# Patient Record
Sex: FEMALE | Race: ASIAN | NOT HISPANIC OR LATINO | URBAN - METROPOLITAN AREA
[De-identification: names, ages, dates, MRNs, and addresses within clinical notes are randomized per-mention and may not be internally consistent; named-entity substitution may affect disease eponyms.]

---

## 2019-11-22 ENCOUNTER — EMERGENCY (EMERGENCY)
Facility: HOSPITAL | Age: 39
LOS: 0 days | Discharge: HOME | End: 2019-11-23
Attending: EMERGENCY MEDICINE
Payer: MEDICAID

## 2019-11-22 VITALS
TEMPERATURE: 98 F | DIASTOLIC BLOOD PRESSURE: 96 MMHG | OXYGEN SATURATION: 98 % | HEART RATE: 76 BPM | WEIGHT: 282.19 LBS | RESPIRATION RATE: 16 BRPM | SYSTOLIC BLOOD PRESSURE: 158 MMHG

## 2019-11-22 DIAGNOSIS — R10.13 EPIGASTRIC PAIN: ICD-10-CM

## 2019-11-22 DIAGNOSIS — R10.9 UNSPECIFIED ABDOMINAL PAIN: ICD-10-CM

## 2019-11-22 DIAGNOSIS — E03.9 HYPOTHYROIDISM, UNSPECIFIED: ICD-10-CM

## 2019-11-22 PROCEDURE — 99284 EMERGENCY DEPT VISIT MOD MDM: CPT

## 2019-11-23 VITALS
SYSTOLIC BLOOD PRESSURE: 142 MMHG | HEART RATE: 64 BPM | DIASTOLIC BLOOD PRESSURE: 89 MMHG | RESPIRATION RATE: 18 BRPM | OXYGEN SATURATION: 100 %

## 2019-11-23 LAB
ALBUMIN SERPL ELPH-MCNC: 3.3 G/DL — SIGNIFICANT CHANGE UP (ref 3.3–5)
ALP SERPL-CCNC: 89 U/L — SIGNIFICANT CHANGE UP (ref 40–120)
ALT FLD-CCNC: 26 U/L — SIGNIFICANT CHANGE UP (ref 12–78)
ANION GAP SERPL CALC-SCNC: 6 MMOL/L — SIGNIFICANT CHANGE UP (ref 5–17)
APPEARANCE UR: CLEAR — SIGNIFICANT CHANGE UP
APTT BLD: 32.8 SEC — SIGNIFICANT CHANGE UP (ref 28.5–37)
AST SERPL-CCNC: 16 U/L — SIGNIFICANT CHANGE UP (ref 15–37)
BASOPHILS # BLD AUTO: 0.06 K/UL — SIGNIFICANT CHANGE UP (ref 0–0.2)
BASOPHILS NFR BLD AUTO: 0.6 % — SIGNIFICANT CHANGE UP (ref 0–2)
BILIRUB SERPL-MCNC: 0.2 MG/DL — SIGNIFICANT CHANGE UP (ref 0.2–1.2)
BILIRUB UR-MCNC: NEGATIVE — SIGNIFICANT CHANGE UP
BUN SERPL-MCNC: 16 MG/DL — SIGNIFICANT CHANGE UP (ref 7–23)
CALCIUM SERPL-MCNC: 8.4 MG/DL — LOW (ref 8.5–10.1)
CHLORIDE SERPL-SCNC: 105 MMOL/L — SIGNIFICANT CHANGE UP (ref 96–108)
CK MB CFR SERPL CALC: 1.1 NG/ML — SIGNIFICANT CHANGE UP (ref 0.5–3.6)
CO2 SERPL-SCNC: 27 MMOL/L — SIGNIFICANT CHANGE UP (ref 22–31)
COLOR SPEC: YELLOW — SIGNIFICANT CHANGE UP
CREAT SERPL-MCNC: 0.78 MG/DL — SIGNIFICANT CHANGE UP (ref 0.5–1.3)
DIFF PNL FLD: NEGATIVE — SIGNIFICANT CHANGE UP
EOSINOPHIL # BLD AUTO: 0.17 K/UL — SIGNIFICANT CHANGE UP (ref 0–0.5)
EOSINOPHIL NFR BLD AUTO: 1.7 % — SIGNIFICANT CHANGE UP (ref 0–6)
EPI CELLS # UR: SIGNIFICANT CHANGE UP
GLUCOSE SERPL-MCNC: 118 MG/DL — HIGH (ref 70–99)
GLUCOSE UR QL: NEGATIVE MG/DL — SIGNIFICANT CHANGE UP
HCG SERPL-ACNC: <1 MIU/ML — SIGNIFICANT CHANGE UP
HCT VFR BLD CALC: 37.6 % — SIGNIFICANT CHANGE UP (ref 34.5–45)
HGB BLD-MCNC: 12.1 G/DL — SIGNIFICANT CHANGE UP (ref 11.5–15.5)
IMM GRANULOCYTES NFR BLD AUTO: 0.3 % — SIGNIFICANT CHANGE UP (ref 0–1.5)
INR BLD: 1.1 RATIO — SIGNIFICANT CHANGE UP (ref 0.88–1.16)
KETONES UR-MCNC: NEGATIVE — SIGNIFICANT CHANGE UP
LACTATE SERPL-SCNC: 1 MMOL/L — SIGNIFICANT CHANGE UP (ref 0.7–2)
LEUKOCYTE ESTERASE UR-ACNC: ABNORMAL
LIDOCAIN IGE QN: 144 U/L — SIGNIFICANT CHANGE UP (ref 73–393)
LYMPHOCYTES # BLD AUTO: 3.67 K/UL — HIGH (ref 1–3.3)
LYMPHOCYTES # BLD AUTO: 36.5 % — SIGNIFICANT CHANGE UP (ref 13–44)
MCHC RBC-ENTMCNC: 25.3 PG — LOW (ref 27–34)
MCHC RBC-ENTMCNC: 32.2 GM/DL — SIGNIFICANT CHANGE UP (ref 32–36)
MCV RBC AUTO: 78.7 FL — LOW (ref 80–100)
MONOCYTES # BLD AUTO: 0.73 K/UL — SIGNIFICANT CHANGE UP (ref 0–0.9)
MONOCYTES NFR BLD AUTO: 7.3 % — SIGNIFICANT CHANGE UP (ref 2–14)
NEUTROPHILS # BLD AUTO: 5.39 K/UL — SIGNIFICANT CHANGE UP (ref 1.8–7.4)
NEUTROPHILS NFR BLD AUTO: 53.6 % — SIGNIFICANT CHANGE UP (ref 43–77)
NITRITE UR-MCNC: NEGATIVE — SIGNIFICANT CHANGE UP
NRBC # BLD: 0 /100 WBCS — SIGNIFICANT CHANGE UP (ref 0–0)
PH UR: 5 — SIGNIFICANT CHANGE UP (ref 5–8)
PLATELET # BLD AUTO: 334 K/UL — SIGNIFICANT CHANGE UP (ref 150–400)
POTASSIUM SERPL-MCNC: 3.6 MMOL/L — SIGNIFICANT CHANGE UP (ref 3.5–5.3)
POTASSIUM SERPL-SCNC: 3.6 MMOL/L — SIGNIFICANT CHANGE UP (ref 3.5–5.3)
PROT SERPL-MCNC: 7.7 GM/DL — SIGNIFICANT CHANGE UP (ref 6–8.3)
PROT UR-MCNC: 30 MG/DL
PROTHROM AB SERPL-ACNC: 12.3 SEC — SIGNIFICANT CHANGE UP (ref 10–12.9)
RBC # BLD: 4.78 M/UL — SIGNIFICANT CHANGE UP (ref 3.8–5.2)
RBC # FLD: 14.2 % — SIGNIFICANT CHANGE UP (ref 10.3–14.5)
RBC CASTS # UR COMP ASSIST: NEGATIVE /HPF — SIGNIFICANT CHANGE UP (ref 0–4)
SODIUM SERPL-SCNC: 138 MMOL/L — SIGNIFICANT CHANGE UP (ref 135–145)
SP GR SPEC: 1.02 — SIGNIFICANT CHANGE UP (ref 1.01–1.02)
TROPONIN I SERPL-MCNC: <.015 NG/ML — SIGNIFICANT CHANGE UP (ref 0.01–0.04)
UROBILINOGEN FLD QL: NEGATIVE MG/DL — SIGNIFICANT CHANGE UP
WBC # BLD: 10.1 K/UL — SIGNIFICANT CHANGE UP (ref 3.8–10.5)
WBC # FLD AUTO: 10.1 K/UL — SIGNIFICANT CHANGE UP (ref 3.8–10.5)
WBC UR QL: SIGNIFICANT CHANGE UP

## 2019-11-23 PROCEDURE — 74177 CT ABD & PELVIS W/CONTRAST: CPT | Mod: 26

## 2019-11-23 RX ORDER — LIDOCAINE 4 G/100G
10 CREAM TOPICAL ONCE
Refills: 0 | Status: COMPLETED | OUTPATIENT
Start: 2019-11-23 | End: 2019-11-23

## 2019-11-23 RX ORDER — METOCLOPRAMIDE HCL 10 MG
10 TABLET ORAL ONCE
Refills: 0 | Status: COMPLETED | OUTPATIENT
Start: 2019-11-23 | End: 2019-11-23

## 2019-11-23 RX ORDER — FAMOTIDINE 10 MG/ML
20 INJECTION INTRAVENOUS ONCE
Refills: 0 | Status: COMPLETED | OUTPATIENT
Start: 2019-11-23 | End: 2019-11-23

## 2019-11-23 RX ORDER — ACETAMINOPHEN 500 MG
975 TABLET ORAL ONCE
Refills: 0 | Status: COMPLETED | OUTPATIENT
Start: 2019-11-23 | End: 2019-11-23

## 2019-11-23 RX ORDER — IOHEXOL 300 MG/ML
30 INJECTION, SOLUTION INTRAVENOUS ONCE
Refills: 0 | Status: COMPLETED | OUTPATIENT
Start: 2019-11-23 | End: 2019-11-23

## 2019-11-23 RX ADMIN — IOHEXOL 30 MILLILITER(S): 300 INJECTION, SOLUTION INTRAVENOUS at 01:59

## 2019-11-23 RX ADMIN — Medication 30 MILLILITER(S): at 02:31

## 2019-11-23 RX ADMIN — LIDOCAINE 10 MILLILITER(S): 4 CREAM TOPICAL at 02:31

## 2019-11-23 RX ADMIN — Medication 10 MILLIGRAM(S): at 00:53

## 2019-11-23 RX ADMIN — Medication 975 MILLIGRAM(S): at 00:54

## 2019-11-23 RX ADMIN — FAMOTIDINE 20 MILLIGRAM(S): 10 INJECTION INTRAVENOUS at 00:54

## 2019-11-23 NOTE — ED PROVIDER NOTE - CLINICAL SUMMARY MEDICAL DECISION MAKING FREE TEXT BOX
40 yo F with epigstric pain, tender, r/o pancreatitis, doubt ischemia, obstruction, pregnancy, ACS, UTI/renal stone   -labs, CT, pepcid/reglan/maalox/tylenol for pain, monitor   -f/u results, reeval

## 2019-11-23 NOTE — ED PROVIDER NOTE - PROGRESS NOTE DETAILS
Results reported to patient--grossly benign, CT shows umbilical hernia (non-obstructed), labs wnl, likely GERD as cause of symptoms   Pt. reports feeling better after meds  pt. agrees to f/u with primary care outpt., referred to GI and Gen Sx for f/u   pt. understands to return to ED if symptoms worsen; will d/c

## 2019-11-23 NOTE — ED PROVIDER NOTE - CARE PROVIDERS DIRECT ADDRESSES
[email or web address]

## 2019-11-23 NOTE — ED PROVIDER NOTE - PROVIDER TOKENS
PROVIDER:[TOKEN:[[id number]],FOLLOWUP:[4-6 Days]],PROVIDER:[TOKEN:[[id number]],FOLLOWUP:[4-6 Days]]

## 2019-11-23 NOTE — ED PROVIDER NOTE - CARE PROVIDER_API CALL
Tian Cortes (MD)  Medicine  [address], [address]  [place] [address]  Phone: [phone]  Fax: [phone]  Follow Up Time: 4-6 Days    Dieudonne Douglass (DO)  Surgery  [address] [place] [address]  Phone: [phone]  Fax: [phone]  Follow Up Time: 4-6 Days

## 2019-11-23 NOTE — ED PROVIDER NOTE - PHYSICAL EXAMINATION
Vitals: HTN at 158/96, otherwise WNL  Gen: AAOx3, NAD, sitting comfortably in stretcher  Head: ncat, perrla, eomi b/l  Neck: supple, no lymphadenopathy, no midline deviation  Heart: rrr, no m/r/g  Lungs: CTA b/l, no rales/ronchi/wheezes  Abd: soft, +tender in epigastrium, obese but non-distended, no rebound or guarding  Ext: no clubbing/cyanosis/edema  Neuro: sensation and muscle strength intact b/l, steady gait

## 2019-11-23 NOTE — ED ADULT NURSE NOTE - OBJECTIVE STATEMENT
# [id number], mid abdominal pain, onset yesterday. Denies any nausea, vomiting, fever, chills, dizziness

## 2019-11-23 NOTE — ED PROVIDER NOTE - PATIENT PORTAL LINK FT
You can access the FollowMyHealth Patient Portal offered by [facility] by registering at the following website: [email or web address]. By joining [employer]’s FollowMyHealth portal, you will also be able to view your health information using other applications (apps) compatible with our system.

## 2019-11-23 NOTE — ED PROVIDER NOTE - OBJECTIVE STATEMENT
Selam  used to speak with patient (andrea):  40 yo F with epigastric abd pain for 2 days, starting in the morning, no inciting event/trauma.  No associated symptoms.  She was treated by pcp with omeprazole with minimal relief.  Pt. requests liver tests as family member  in recent past for liver disease.  No other complaints.  Pt. feels well otherwise.  Pain is non-radiating, constant in epigastrium.  ROS: negative for fever, cough, headache, chest pain, shortness of breath, nausea, vomiting, diarrhea, rash, paresthesia, and weakness--all other systems reviewed are negative.   PMH: hypothyroid, GERD; Meds: See EMR for list; SH: Denies smoking/drinking/drug use

## 2019-11-24 LAB
CULTURE RESULTS: SIGNIFICANT CHANGE UP
SPECIMEN SOURCE: SIGNIFICANT CHANGE UP

## 2021-05-18 ENCOUNTER — APPOINTMENT (OUTPATIENT)
Dept: DISASTER EMERGENCY | Facility: OTHER | Age: 41
End: 2021-05-18
Payer: MEDICAID

## 2021-05-18 PROCEDURE — 0012A: CPT

## 2022-01-04 ENCOUNTER — EMERGENCY (EMERGENCY)
Facility: HOSPITAL | Age: 42
LOS: 0 days | Discharge: HOME | End: 2022-01-04
Attending: STUDENT IN AN ORGANIZED HEALTH CARE EDUCATION/TRAINING PROGRAM
Payer: MEDICAID

## 2022-01-04 VITALS
WEIGHT: 240.08 LBS | DIASTOLIC BLOOD PRESSURE: 78 MMHG | RESPIRATION RATE: 22 BRPM | TEMPERATURE: 97 F | SYSTOLIC BLOOD PRESSURE: 129 MMHG | HEART RATE: 65 BPM | OXYGEN SATURATION: 100 %

## 2022-01-04 VITALS
SYSTOLIC BLOOD PRESSURE: 106 MMHG | DIASTOLIC BLOOD PRESSURE: 75 MMHG | RESPIRATION RATE: 17 BRPM | HEART RATE: 86 BPM | TEMPERATURE: 99 F | OXYGEN SATURATION: 98 %

## 2022-01-04 DIAGNOSIS — Z87.19 PERSONAL HISTORY OF OTHER DISEASES OF THE DIGESTIVE SYSTEM: ICD-10-CM

## 2022-01-04 DIAGNOSIS — R11.10 VOMITING, UNSPECIFIED: ICD-10-CM

## 2022-01-04 DIAGNOSIS — R10.33 PERIUMBILICAL PAIN: ICD-10-CM

## 2022-01-04 DIAGNOSIS — R10.9 UNSPECIFIED ABDOMINAL PAIN: ICD-10-CM

## 2022-01-04 LAB
ALBUMIN SERPL ELPH-MCNC: 3.3 G/DL — SIGNIFICANT CHANGE UP (ref 3.3–5)
ALP SERPL-CCNC: 88 U/L — SIGNIFICANT CHANGE UP (ref 40–120)
ALT FLD-CCNC: 28 U/L — SIGNIFICANT CHANGE UP (ref 12–78)
ANION GAP SERPL CALC-SCNC: 8 MMOL/L — SIGNIFICANT CHANGE UP (ref 5–17)
APPEARANCE UR: CLEAR — SIGNIFICANT CHANGE UP
AST SERPL-CCNC: 16 U/L — SIGNIFICANT CHANGE UP (ref 15–37)
BACTERIA # UR AUTO: ABNORMAL
BASOPHILS # BLD AUTO: 0.04 K/UL — SIGNIFICANT CHANGE UP (ref 0–0.2)
BASOPHILS NFR BLD AUTO: 0.3 % — SIGNIFICANT CHANGE UP (ref 0–2)
BILIRUB SERPL-MCNC: 0.3 MG/DL — SIGNIFICANT CHANGE UP (ref 0.2–1.2)
BILIRUB UR-MCNC: NEGATIVE — SIGNIFICANT CHANGE UP
BUN SERPL-MCNC: 14 MG/DL — SIGNIFICANT CHANGE UP (ref 7–23)
CALCIUM SERPL-MCNC: 9.3 MG/DL — SIGNIFICANT CHANGE UP (ref 8.5–10.1)
CHLORIDE SERPL-SCNC: 101 MMOL/L — SIGNIFICANT CHANGE UP (ref 96–108)
CO2 SERPL-SCNC: 26 MMOL/L — SIGNIFICANT CHANGE UP (ref 22–31)
COLOR SPEC: YELLOW — SIGNIFICANT CHANGE UP
CREAT SERPL-MCNC: 0.82 MG/DL — SIGNIFICANT CHANGE UP (ref 0.5–1.3)
DIFF PNL FLD: ABNORMAL
EOSINOPHIL # BLD AUTO: 0.02 K/UL — SIGNIFICANT CHANGE UP (ref 0–0.5)
EOSINOPHIL NFR BLD AUTO: 0.2 % — SIGNIFICANT CHANGE UP (ref 0–6)
EPI CELLS # UR: SIGNIFICANT CHANGE UP
FLUAV AG NPH QL: SIGNIFICANT CHANGE UP
FLUBV AG NPH QL: SIGNIFICANT CHANGE UP
GLUCOSE SERPL-MCNC: 137 MG/DL — HIGH (ref 70–99)
GLUCOSE UR QL: NEGATIVE MG/DL — SIGNIFICANT CHANGE UP
HCG SERPL-ACNC: <1 MIU/ML — SIGNIFICANT CHANGE UP
HCT VFR BLD CALC: 41.2 % — SIGNIFICANT CHANGE UP (ref 34.5–45)
HGB BLD-MCNC: 13.5 G/DL — SIGNIFICANT CHANGE UP (ref 11.5–15.5)
IMM GRANULOCYTES NFR BLD AUTO: 0.4 % — SIGNIFICANT CHANGE UP (ref 0–1.5)
KETONES UR-MCNC: ABNORMAL
LACTATE SERPL-SCNC: 1.7 MMOL/L — SIGNIFICANT CHANGE UP (ref 0.7–2)
LEUKOCYTE ESTERASE UR-ACNC: ABNORMAL
LIDOCAIN IGE QN: 91 U/L — SIGNIFICANT CHANGE UP (ref 73–393)
LYMPHOCYTES # BLD AUTO: 1.6 K/UL — SIGNIFICANT CHANGE UP (ref 1–3.3)
LYMPHOCYTES # BLD AUTO: 13.5 % — SIGNIFICANT CHANGE UP (ref 13–44)
MCHC RBC-ENTMCNC: 25.2 PG — LOW (ref 27–34)
MCHC RBC-ENTMCNC: 32.8 GM/DL — SIGNIFICANT CHANGE UP (ref 32–36)
MCV RBC AUTO: 77 FL — LOW (ref 80–100)
MONOCYTES # BLD AUTO: 0.47 K/UL — SIGNIFICANT CHANGE UP (ref 0–0.9)
MONOCYTES NFR BLD AUTO: 4 % — SIGNIFICANT CHANGE UP (ref 2–14)
NEUTROPHILS # BLD AUTO: 9.69 K/UL — HIGH (ref 1.8–7.4)
NEUTROPHILS NFR BLD AUTO: 81.6 % — HIGH (ref 43–77)
NITRITE UR-MCNC: NEGATIVE — SIGNIFICANT CHANGE UP
NRBC # BLD: 0 /100 WBCS — SIGNIFICANT CHANGE UP (ref 0–0)
PH UR: 5 — SIGNIFICANT CHANGE UP (ref 5–8)
PLATELET # BLD AUTO: 375 K/UL — SIGNIFICANT CHANGE UP (ref 150–400)
POTASSIUM SERPL-MCNC: 4.1 MMOL/L — SIGNIFICANT CHANGE UP (ref 3.5–5.3)
POTASSIUM SERPL-SCNC: 4.1 MMOL/L — SIGNIFICANT CHANGE UP (ref 3.5–5.3)
PROT SERPL-MCNC: 8 GM/DL — SIGNIFICANT CHANGE UP (ref 6–8.3)
PROT UR-MCNC: 30 MG/DL
RBC # BLD: 5.35 M/UL — HIGH (ref 3.8–5.2)
RBC # FLD: 14 % — SIGNIFICANT CHANGE UP (ref 10.3–14.5)
RBC CASTS # UR COMP ASSIST: ABNORMAL /HPF (ref 0–4)
SARS-COV-2 RNA SPEC QL NAA+PROBE: SIGNIFICANT CHANGE UP
SODIUM SERPL-SCNC: 135 MMOL/L — SIGNIFICANT CHANGE UP (ref 135–145)
SP GR SPEC: 1.02 — SIGNIFICANT CHANGE UP (ref 1.01–1.02)
UROBILINOGEN FLD QL: NEGATIVE MG/DL — SIGNIFICANT CHANGE UP
WBC # BLD: 11.87 K/UL — HIGH (ref 3.8–10.5)
WBC # FLD AUTO: 11.87 K/UL — HIGH (ref 3.8–10.5)
WBC UR QL: SIGNIFICANT CHANGE UP

## 2022-01-04 PROCEDURE — 99285 EMERGENCY DEPT VISIT HI MDM: CPT

## 2022-01-04 PROCEDURE — 74177 CT ABD & PELVIS W/CONTRAST: CPT | Mod: 26,MA

## 2022-01-04 RX ORDER — KETOROLAC TROMETHAMINE 30 MG/ML
15 SYRINGE (ML) INJECTION ONCE
Refills: 0 | Status: DISCONTINUED | OUTPATIENT
Start: 2022-01-04 | End: 2022-01-04

## 2022-01-04 RX ORDER — SODIUM CHLORIDE 9 MG/ML
1000 INJECTION INTRAMUSCULAR; INTRAVENOUS; SUBCUTANEOUS ONCE
Refills: 0 | Status: COMPLETED | OUTPATIENT
Start: 2022-01-04 | End: 2022-01-04

## 2022-01-04 RX ORDER — ONDANSETRON 8 MG/1
4 TABLET, FILM COATED ORAL ONCE
Refills: 0 | Status: COMPLETED | OUTPATIENT
Start: 2022-01-04 | End: 2022-01-04

## 2022-01-04 RX ADMIN — Medication 15 MILLIGRAM(S): at 13:30

## 2022-01-04 RX ADMIN — SODIUM CHLORIDE 1000 MILLILITER(S): 9 INJECTION INTRAMUSCULAR; INTRAVENOUS; SUBCUTANEOUS at 13:30

## 2022-01-04 RX ADMIN — ONDANSETRON 4 MILLIGRAM(S): 8 TABLET, FILM COATED ORAL at 13:30

## 2022-01-04 NOTE — ED ADULT NURSE NOTE - OBJECTIVE STATEMENT
42 YO F here for upper abdomen / epigastric pain, nausea, vomiting.  resting comfortably on stretcher in NAD. placed piv sent labs and administered meds per mar.

## 2022-01-04 NOTE — ED PROVIDER NOTE - PHYSICAL EXAMINATION
General: Awake, alert and oriented. No acute distress. Well developed, hydrated and nourished. Appears stated age.   Skin: Skin in warm, dry and intact without rashes or lesions. Appropriate color for ethnicity  HENMT: head normocephalic and atraumatic; bilateral external ears without swelling. no nasal discharge. moist oral mucosa. supple neck, trachea midline  EYES: Conjunctiva clear. nonicteric sclera. EOM intact, Eyelids are normal in appearance without swelling or lesions.  Cardiac: well perfused  Respiratory: breathing comfortably on room air. no audible wheezing or stridor  Abdominal: nondistended, soft, moderate periumbilical ttp  MSK: Neck and back are without deformity, visible external skin changes, or signs of trauma. Curvature of the cervical, thoracic, and lumbar spine are within normal limits. no external signs of trauma. no apparent deficits in ROM of any extremity  Neurological: The patient is awake, alert and oriented to person, place, and time with normal speech. CN 2-12 grossly intact. no apparent deficits. Memory is normal and thought process is intact. No gait abnormalities are appreciated.   Psychiatric: Appropriate mood and affect. Good judgement and insight. No visual or auditory hallucinations.

## 2022-01-04 NOTE — ED ADULT NURSE NOTE - CHIEF COMPLAINT QUOTE
as per patient c/o epigastric abdominal pain starting this morning. N/V, denies blood in stool or vomit. Pt appearing distressed, breathing labored on presentation. Hx: HTN

## 2022-01-04 NOTE — ED ADULT TRIAGE NOTE - CHIEF COMPLAINT QUOTE
as per patient c/o epigastric abdominal pain starting this morning. N/V, denies blood in stool or vomit. as per patient c/o epigastric abdominal pain starting this morning. N/V, denies blood in stool or vomit. Pt appearing distressed, breathing labored on presentation. Hx: HTN

## 2022-01-04 NOTE — ED PROVIDER NOTE - CLINICAL SUMMARY MEDICAL DECISION MAKING FREE TEXT BOX
mary janenet feeling much better in ED. evaluated for possible intrabdoinal pahtology considering residual ttp. ct with multifibroid uterus, aptient has gyn follow up. will dc

## 2022-01-04 NOTE — ED PROVIDER NOTE - PATIENT PORTAL LINK FT
You can access the FollowMyHealth Patient Portal offered by [facility] by registering at the following website: [email or web address]. By joining [employer]’s FollowMyHealth portal, you will also be able to view your health information using other applications (apps) compatible with our system.

## 2022-01-04 NOTE — ED PROVIDER NOTE - OBJECTIVE STATEMENT
Pt is a 41 year old female w/PMH gastritis, GERD, hypothyroidism s/p CCY who presents to the ED today for abdominal pain and 1 episode of vomit this morning. Symptoms are mostly gone but still has mild abdominal pain. Denies fevers or recent illness.

## 2022-09-12 ENCOUNTER — TRANSCRIPTION ENCOUNTER (OUTPATIENT)
Age: 42
End: 2022-09-12

## 2022-09-13 ENCOUNTER — RESULT REVIEW (OUTPATIENT)
Age: 42
End: 2022-09-13

## 2022-09-13 ENCOUNTER — INPATIENT (INPATIENT)
Facility: HOSPITAL | Age: 42
LOS: 1 days | Discharge: HOME | End: 2022-09-15
Attending: SURGERY | Admitting: SURGERY

## 2022-09-13 VITALS
RESPIRATION RATE: 19 BRPM | OXYGEN SATURATION: 100 % | SYSTOLIC BLOOD PRESSURE: 140 MMHG | HEART RATE: 83 BPM | WEIGHT: 259.93 LBS | DIASTOLIC BLOOD PRESSURE: 78 MMHG | TEMPERATURE: 98 F | HEIGHT: 65 IN

## 2022-09-13 DIAGNOSIS — Z98.890 OTHER SPECIFIED POSTPROCEDURAL STATES: Chronic | ICD-10-CM

## 2022-09-13 DIAGNOSIS — Z98.891 HISTORY OF UTERINE SCAR FROM PREVIOUS SURGERY: Chronic | ICD-10-CM

## 2022-09-13 DIAGNOSIS — Z90.49 ACQUIRED ABSENCE OF OTHER SPECIFIED PARTS OF DIGESTIVE TRACT: Chronic | ICD-10-CM

## 2022-09-13 PROBLEM — Z78.9 OTHER SPECIFIED HEALTH STATUS: Chronic | Status: ACTIVE | Noted: 2022-01-09

## 2022-09-13 LAB
ALBUMIN SERPL ELPH-MCNC: 3.6 G/DL — SIGNIFICANT CHANGE UP (ref 3.3–5)
ALP SERPL-CCNC: 81 U/L — SIGNIFICANT CHANGE UP (ref 40–120)
ALT FLD-CCNC: 36 U/L — SIGNIFICANT CHANGE UP (ref 12–78)
ANION GAP SERPL CALC-SCNC: 14 MMOL/L — SIGNIFICANT CHANGE UP (ref 5–17)
APPEARANCE UR: CLEAR — SIGNIFICANT CHANGE UP
APTT BLD: 27.4 SEC — LOW (ref 27.5–35.5)
AST SERPL-CCNC: 28 U/L — SIGNIFICANT CHANGE UP (ref 15–37)
BACTERIA # UR AUTO: ABNORMAL
BASOPHILS # BLD AUTO: 0.06 K/UL — SIGNIFICANT CHANGE UP (ref 0–0.2)
BASOPHILS NFR BLD AUTO: 0.4 % — SIGNIFICANT CHANGE UP (ref 0–2)
BILIRUB SERPL-MCNC: 0.4 MG/DL — SIGNIFICANT CHANGE UP (ref 0.2–1.2)
BILIRUB UR-MCNC: NEGATIVE — SIGNIFICANT CHANGE UP
BLD GP AB SCN SERPL QL: SIGNIFICANT CHANGE UP
BUN SERPL-MCNC: 8 MG/DL — SIGNIFICANT CHANGE UP (ref 7–23)
CALCIUM SERPL-MCNC: 9.1 MG/DL — SIGNIFICANT CHANGE UP (ref 8.5–10.1)
CHLORIDE SERPL-SCNC: 106 MMOL/L — SIGNIFICANT CHANGE UP (ref 96–108)
CO2 SERPL-SCNC: 19 MMOL/L — LOW (ref 22–31)
COLOR SPEC: YELLOW — SIGNIFICANT CHANGE UP
CREAT SERPL-MCNC: 0.89 MG/DL — SIGNIFICANT CHANGE UP (ref 0.5–1.3)
DIFF PNL FLD: NEGATIVE — SIGNIFICANT CHANGE UP
EGFR: 82 ML/MIN/1.73M2 — SIGNIFICANT CHANGE UP
EOSINOPHIL # BLD AUTO: 0.03 K/UL — SIGNIFICANT CHANGE UP (ref 0–0.5)
EOSINOPHIL NFR BLD AUTO: 0.2 % — SIGNIFICANT CHANGE UP (ref 0–6)
EPI CELLS # UR: SIGNIFICANT CHANGE UP
FLUAV AG NPH QL: SIGNIFICANT CHANGE UP
FLUBV AG NPH QL: SIGNIFICANT CHANGE UP
GLUCOSE BLDC GLUCOMTR-MCNC: 127 MG/DL — HIGH (ref 70–99)
GLUCOSE BLDC GLUCOMTR-MCNC: 162 MG/DL — HIGH (ref 70–99)
GLUCOSE BLDC GLUCOMTR-MCNC: 237 MG/DL — HIGH (ref 70–99)
GLUCOSE SERPL-MCNC: 223 MG/DL — HIGH (ref 70–99)
GLUCOSE UR QL: 250 MG/DL
HCG SERPL-ACNC: <1 MIU/ML — SIGNIFICANT CHANGE UP
HCT VFR BLD CALC: 41.2 % — SIGNIFICANT CHANGE UP (ref 34.5–45)
HGB BLD-MCNC: 13.5 G/DL — SIGNIFICANT CHANGE UP (ref 11.5–15.5)
IMM GRANULOCYTES NFR BLD AUTO: 0.4 % — SIGNIFICANT CHANGE UP (ref 0–1.5)
INR BLD: 1.1 RATIO — SIGNIFICANT CHANGE UP (ref 0.88–1.16)
KETONES UR-MCNC: ABNORMAL
LACTATE SERPL-SCNC: 2.8 MMOL/L — HIGH (ref 0.7–2)
LACTATE SERPL-SCNC: 4.9 MMOL/L — CRITICAL HIGH (ref 0.7–2)
LEUKOCYTE ESTERASE UR-ACNC: NEGATIVE — SIGNIFICANT CHANGE UP
LYMPHOCYTES # BLD AUTO: 16.4 % — SIGNIFICANT CHANGE UP (ref 13–44)
LYMPHOCYTES # BLD AUTO: 2.78 K/UL — SIGNIFICANT CHANGE UP (ref 1–3.3)
MCHC RBC-ENTMCNC: 25.1 PG — LOW (ref 27–34)
MCHC RBC-ENTMCNC: 32.8 G/DL — SIGNIFICANT CHANGE UP (ref 32–36)
MCV RBC AUTO: 76.6 FL — LOW (ref 80–100)
MONOCYTES # BLD AUTO: 0.76 K/UL — SIGNIFICANT CHANGE UP (ref 0–0.9)
MONOCYTES NFR BLD AUTO: 4.5 % — SIGNIFICANT CHANGE UP (ref 2–14)
NEUTROPHILS # BLD AUTO: 13.26 K/UL — HIGH (ref 1.8–7.4)
NEUTROPHILS NFR BLD AUTO: 78.1 % — HIGH (ref 43–77)
NITRITE UR-MCNC: NEGATIVE — SIGNIFICANT CHANGE UP
NRBC # BLD: 0 /100 WBCS — SIGNIFICANT CHANGE UP (ref 0–0)
PH UR: 6 — SIGNIFICANT CHANGE UP (ref 5–8)
PLATELET # BLD AUTO: 401 K/UL — HIGH (ref 150–400)
POTASSIUM SERPL-MCNC: 2.8 MMOL/L — CRITICAL LOW (ref 3.5–5.3)
POTASSIUM SERPL-SCNC: 2.8 MMOL/L — CRITICAL LOW (ref 3.5–5.3)
PROT SERPL-MCNC: 8.2 GM/DL — SIGNIFICANT CHANGE UP (ref 6–8.3)
PROT UR-MCNC: 30 MG/DL
PROTHROM AB SERPL-ACNC: 13.2 SEC — SIGNIFICANT CHANGE UP (ref 10.5–13.4)
RBC # BLD: 5.38 M/UL — HIGH (ref 3.8–5.2)
RBC # FLD: 15.1 % — HIGH (ref 10.3–14.5)
RBC CASTS # UR COMP ASSIST: SIGNIFICANT CHANGE UP /HPF (ref 0–4)
SARS-COV-2 RNA SPEC QL NAA+PROBE: SIGNIFICANT CHANGE UP
SODIUM SERPL-SCNC: 139 MMOL/L — SIGNIFICANT CHANGE UP (ref 135–145)
SP GR SPEC: 1.01 — SIGNIFICANT CHANGE UP (ref 1.01–1.02)
UROBILINOGEN FLD QL: NEGATIVE MG/DL — SIGNIFICANT CHANGE UP
WBC # BLD: 16.96 K/UL — HIGH (ref 3.8–10.5)
WBC # FLD AUTO: 16.96 K/UL — HIGH (ref 3.8–10.5)
WBC UR QL: SIGNIFICANT CHANGE UP

## 2022-09-13 PROCEDURE — 99285 EMERGENCY DEPT VISIT HI MDM: CPT

## 2022-09-13 PROCEDURE — 49566: CPT

## 2022-09-13 PROCEDURE — 74177 CT ABD & PELVIS W/CONTRAST: CPT | Mod: 26,MA

## 2022-09-13 PROCEDURE — 88305 TISSUE EXAM BY PATHOLOGIST: CPT | Mod: 26

## 2022-09-13 PROCEDURE — 88302 TISSUE EXAM BY PATHOLOGIST: CPT | Mod: 26

## 2022-09-13 PROCEDURE — 99223 1ST HOSP IP/OBS HIGH 75: CPT | Mod: 57

## 2022-09-13 PROCEDURE — 93010 ELECTROCARDIOGRAM REPORT: CPT

## 2022-09-13 RX ORDER — POTASSIUM CHLORIDE 20 MEQ
10 PACKET (EA) ORAL
Refills: 0 | Status: COMPLETED | OUTPATIENT
Start: 2022-09-13 | End: 2022-09-13

## 2022-09-13 RX ORDER — POTASSIUM CHLORIDE 20 MEQ
40 PACKET (EA) ORAL ONCE
Refills: 0 | Status: COMPLETED | OUTPATIENT
Start: 2022-09-13 | End: 2022-09-13

## 2022-09-13 RX ORDER — DEXTROSE MONOHYDRATE, SODIUM CHLORIDE, AND POTASSIUM CHLORIDE 50; .745; 4.5 G/1000ML; G/1000ML; G/1000ML
1000 INJECTION, SOLUTION INTRAVENOUS
Refills: 0 | Status: DISCONTINUED | OUTPATIENT
Start: 2022-09-13 | End: 2022-09-15

## 2022-09-13 RX ORDER — DEXTROSE 50 % IN WATER 50 %
25 SYRINGE (ML) INTRAVENOUS ONCE
Refills: 0 | Status: DISCONTINUED | OUTPATIENT
Start: 2022-09-13 | End: 2022-09-15

## 2022-09-13 RX ORDER — HYDROMORPHONE HYDROCHLORIDE 2 MG/ML
1 INJECTION INTRAMUSCULAR; INTRAVENOUS; SUBCUTANEOUS
Refills: 0 | Status: DISCONTINUED | OUTPATIENT
Start: 2022-09-13 | End: 2022-09-13

## 2022-09-13 RX ORDER — PIPERACILLIN AND TAZOBACTAM 4; .5 G/20ML; G/20ML
3.38 INJECTION, POWDER, LYOPHILIZED, FOR SOLUTION INTRAVENOUS ONCE
Refills: 0 | Status: DISCONTINUED | OUTPATIENT
Start: 2022-09-13 | End: 2022-09-13

## 2022-09-13 RX ORDER — AMLODIPINE BESYLATE 2.5 MG/1
10 TABLET ORAL DAILY
Refills: 0 | Status: DISCONTINUED | OUTPATIENT
Start: 2022-09-13 | End: 2022-09-13

## 2022-09-13 RX ORDER — SODIUM CHLORIDE 9 MG/ML
1000 INJECTION, SOLUTION INTRAVENOUS
Refills: 0 | Status: DISCONTINUED | OUTPATIENT
Start: 2022-09-13 | End: 2022-09-15

## 2022-09-13 RX ORDER — AMLODIPINE BESYLATE 2.5 MG/1
1 TABLET ORAL
Qty: 0 | Refills: 0 | DISCHARGE

## 2022-09-13 RX ORDER — ONDANSETRON 8 MG/1
4 TABLET, FILM COATED ORAL ONCE
Refills: 0 | Status: COMPLETED | OUTPATIENT
Start: 2022-09-13 | End: 2022-09-13

## 2022-09-13 RX ORDER — DEXTROSE 50 % IN WATER 50 %
12.5 SYRINGE (ML) INTRAVENOUS ONCE
Refills: 0 | Status: DISCONTINUED | OUTPATIENT
Start: 2022-09-13 | End: 2022-09-15

## 2022-09-13 RX ORDER — DEXTROSE 50 % IN WATER 50 %
15 SYRINGE (ML) INTRAVENOUS ONCE
Refills: 0 | Status: DISCONTINUED | OUTPATIENT
Start: 2022-09-13 | End: 2022-09-15

## 2022-09-13 RX ORDER — SODIUM CHLORIDE 9 MG/ML
1000 INJECTION INTRAMUSCULAR; INTRAVENOUS; SUBCUTANEOUS ONCE
Refills: 0 | Status: COMPLETED | OUTPATIENT
Start: 2022-09-13 | End: 2022-09-13

## 2022-09-13 RX ORDER — HYDROMORPHONE HYDROCHLORIDE 2 MG/ML
0.5 INJECTION INTRAMUSCULAR; INTRAVENOUS; SUBCUTANEOUS ONCE
Refills: 0 | Status: DISCONTINUED | OUTPATIENT
Start: 2022-09-13 | End: 2022-09-13

## 2022-09-13 RX ORDER — LEVOTHYROXINE SODIUM 125 MCG
88 TABLET ORAL AT BEDTIME
Refills: 0 | Status: DISCONTINUED | OUTPATIENT
Start: 2022-09-13 | End: 2022-09-15

## 2022-09-13 RX ORDER — LEVOTHYROXINE SODIUM 125 MCG
1 TABLET ORAL
Qty: 0 | Refills: 0 | DISCHARGE

## 2022-09-13 RX ORDER — CHOLECALCIFEROL (VITAMIN D3) 125 MCG
1 CAPSULE ORAL
Qty: 0 | Refills: 0 | DISCHARGE

## 2022-09-13 RX ORDER — LISINOPRIL 2.5 MG/1
1 TABLET ORAL
Qty: 0 | Refills: 0 | DISCHARGE

## 2022-09-13 RX ORDER — ENOXAPARIN SODIUM 100 MG/ML
40 INJECTION SUBCUTANEOUS EVERY 12 HOURS
Refills: 0 | Status: DISCONTINUED | OUTPATIENT
Start: 2022-09-13 | End: 2022-09-15

## 2022-09-13 RX ORDER — SODIUM CHLORIDE 9 MG/ML
1000 INJECTION, SOLUTION INTRAVENOUS
Refills: 0 | Status: DISCONTINUED | OUTPATIENT
Start: 2022-09-13 | End: 2022-09-13

## 2022-09-13 RX ORDER — PREGABALIN 225 MG/1
1 CAPSULE ORAL
Qty: 0 | Refills: 0 | DISCHARGE

## 2022-09-13 RX ORDER — HYDROCHLOROTHIAZIDE 25 MG
12.5 TABLET ORAL DAILY
Refills: 0 | Status: DISCONTINUED | OUTPATIENT
Start: 2022-09-13 | End: 2022-09-13

## 2022-09-13 RX ORDER — LISINOPRIL 2.5 MG/1
5 TABLET ORAL DAILY
Refills: 0 | Status: DISCONTINUED | OUTPATIENT
Start: 2022-09-13 | End: 2022-09-13

## 2022-09-13 RX ORDER — INSULIN LISPRO 100/ML
VIAL (ML) SUBCUTANEOUS
Refills: 0 | Status: DISCONTINUED | OUTPATIENT
Start: 2022-09-13 | End: 2022-09-15

## 2022-09-13 RX ORDER — FERROUS SULFATE 325(65) MG
1 TABLET ORAL
Qty: 0 | Refills: 0 | DISCHARGE

## 2022-09-13 RX ORDER — GLUCAGON INJECTION, SOLUTION 0.5 MG/.1ML
1 INJECTION, SOLUTION SUBCUTANEOUS ONCE
Refills: 0 | Status: DISCONTINUED | OUTPATIENT
Start: 2022-09-13 | End: 2022-09-15

## 2022-09-13 RX ORDER — LEVOTHYROXINE SODIUM 125 MCG
125 TABLET ORAL DAILY
Refills: 0 | Status: DISCONTINUED | OUTPATIENT
Start: 2022-09-13 | End: 2022-09-13

## 2022-09-13 RX ORDER — PIPERACILLIN AND TAZOBACTAM 4; .5 G/20ML; G/20ML
3.38 INJECTION, POWDER, LYOPHILIZED, FOR SOLUTION INTRAVENOUS ONCE
Refills: 0 | Status: COMPLETED | OUTPATIENT
Start: 2022-09-13 | End: 2022-09-13

## 2022-09-13 RX ORDER — METFORMIN HYDROCHLORIDE 850 MG/1
1 TABLET ORAL
Qty: 0 | Refills: 0 | DISCHARGE

## 2022-09-13 RX ORDER — INFLUENZA VIRUS VACCINE 15; 15; 15; 15 UG/.5ML; UG/.5ML; UG/.5ML; UG/.5ML
0.5 SUSPENSION INTRAMUSCULAR ONCE
Refills: 0 | Status: DISCONTINUED | OUTPATIENT
Start: 2022-09-13 | End: 2022-09-15

## 2022-09-13 RX ORDER — INSULIN LISPRO 100/ML
VIAL (ML) SUBCUTANEOUS AT BEDTIME
Refills: 0 | Status: DISCONTINUED | OUTPATIENT
Start: 2022-09-13 | End: 2022-09-15

## 2022-09-13 RX ORDER — MORPHINE SULFATE 50 MG/1
4 CAPSULE, EXTENDED RELEASE ORAL ONCE
Refills: 0 | Status: DISCONTINUED | OUTPATIENT
Start: 2022-09-13 | End: 2022-09-13

## 2022-09-13 RX ORDER — ONDANSETRON 8 MG/1
4 TABLET, FILM COATED ORAL ONCE
Refills: 0 | Status: DISCONTINUED | OUTPATIENT
Start: 2022-09-13 | End: 2022-09-13

## 2022-09-13 RX ORDER — HYDROMORPHONE HYDROCHLORIDE 2 MG/ML
2 INJECTION INTRAMUSCULAR; INTRAVENOUS; SUBCUTANEOUS
Refills: 0 | Status: DISCONTINUED | OUTPATIENT
Start: 2022-09-13 | End: 2022-09-13

## 2022-09-13 RX ADMIN — Medication 100 MILLIEQUIVALENT(S): at 15:45

## 2022-09-13 RX ADMIN — ONDANSETRON 4 MILLIGRAM(S): 8 TABLET, FILM COATED ORAL at 13:00

## 2022-09-13 RX ADMIN — Medication 88 MICROGRAM(S): at 22:25

## 2022-09-13 RX ADMIN — MORPHINE SULFATE 4 MILLIGRAM(S): 50 CAPSULE, EXTENDED RELEASE ORAL at 13:38

## 2022-09-13 RX ADMIN — DEXTROSE MONOHYDRATE, SODIUM CHLORIDE, AND POTASSIUM CHLORIDE 125 MILLILITER(S): 50; .745; 4.5 INJECTION, SOLUTION INTRAVENOUS at 22:05

## 2022-09-13 RX ADMIN — Medication 100 MILLIEQUIVALENT(S): at 23:54

## 2022-09-13 RX ADMIN — Medication 100 MILLIEQUIVALENT(S): at 14:35

## 2022-09-13 RX ADMIN — PIPERACILLIN AND TAZOBACTAM 200 GRAM(S): 4; .5 INJECTION, POWDER, LYOPHILIZED, FOR SOLUTION INTRAVENOUS at 14:56

## 2022-09-13 RX ADMIN — Medication 40 MILLIEQUIVALENT(S): at 14:34

## 2022-09-13 RX ADMIN — HYDROMORPHONE HYDROCHLORIDE 0.5 MILLIGRAM(S): 2 INJECTION INTRAMUSCULAR; INTRAVENOUS; SUBCUTANEOUS at 14:01

## 2022-09-13 RX ADMIN — Medication 100 MILLIEQUIVALENT(S): at 19:45

## 2022-09-13 RX ADMIN — SODIUM CHLORIDE 100 MILLILITER(S): 9 INJECTION, SOLUTION INTRAVENOUS at 19:45

## 2022-09-13 RX ADMIN — SODIUM CHLORIDE 1000 MILLILITER(S): 9 INJECTION INTRAMUSCULAR; INTRAVENOUS; SUBCUTANEOUS at 13:00

## 2022-09-13 RX ADMIN — Medication 100 MILLIEQUIVALENT(S): at 16:30

## 2022-09-13 RX ADMIN — SODIUM CHLORIDE 1000 MILLILITER(S): 9 INJECTION INTRAMUSCULAR; INTRAVENOUS; SUBCUTANEOUS at 14:56

## 2022-09-13 NOTE — ED ADULT NURSE NOTE - AS SC BRADEN MOBILITY
Health Maintenance Due   Topic Date Due   • Shingles Vaccine (1 of 2) 06/10/2007   • Influenza Vaccine (1) 08/01/2020       Patient is due for topics as listed above but is not proceeding with Immunization(s) Influenza and Shingles at this time.          (4) no limitation

## 2022-09-13 NOTE — H&P ADULT - NSICDXPASTMEDICALHX_GEN_ALL_CORE_FT
PAST MEDICAL HISTORY:  DM (diabetes mellitus)     HTN (hypertension)     No pertinent past medical history

## 2022-09-13 NOTE — ED ADULT NURSE NOTE - OBJECTIVE STATEMENT
Patient complaining of pain to abdomen that became unbearable, the pain started around 0930 this morning. Patient speaks Urdo, family member present at bedside, ok with patient to interpret.

## 2022-09-13 NOTE — BRIEF OPERATIVE NOTE - NSICDXBRIEFPROCEDURE_GEN_ALL_CORE_FT
PROCEDURES:  Repair of incarcerated ventral hernia 14-Sep-2022 12:52:35  Gal Watkins  Laparotomy for bowel obstruction 14-Sep-2022 12:53:17  Gal Watkins

## 2022-09-13 NOTE — H&P ADULT - NSHPPHYSICALEXAM_GEN_ALL_CORE
Vital Signs Last 24 Hrs  T(C): 36.6 (13 Sep 2022 15:20), Max: 36.6 (13 Sep 2022 12:23)  T(F): 97.8 (13 Sep 2022 15:20), Max: 97.8 (13 Sep 2022 12:23)  HR: 81 (13 Sep 2022 15:20) (81 - 83)  BP: 119/69 (13 Sep 2022 15:20) (119/69 - 140/78)  BP(mean): --  RR: 16 (13 Sep 2022 15:20) (16 - 19)  SpO2: 100% (13 Sep 2022 15:20) (100% - 100%)    Parameters below as of 13 Sep 2022 15:20  Patient On (Oxygen Delivery Method): room air        Constitutional: awake and alert.  HEENT: PERRLA, EOMI  Neck: Supple.  Respiratory: Breath sounds are clear bilaterally  Cardiovascular: S1 and S2, regular   Gastrointestinal: soft, left upper quadrant palpable ventral hernia non reducible, slight erythema.   Extremities:  no edema  Musculoskeletal: no joint swelling/tenderness, no abnormal movements  Skin: No rashes

## 2022-09-13 NOTE — PATIENT PROFILE ADULT - FALL HARM RISK - HARM RISK INTERVENTIONS

## 2022-09-13 NOTE — H&P ADULT - HISTORY OF PRESENT ILLNESS
41 year old female woke with abdominal pain which struck her suddenly at 10:30am, endorses had BM yesterday but none today and has been vomiting. CT scan shows closed loop sbo and ventral hernia. Hx of ventral hernia repair, cholecystectomy and . Hx of DM and HTN. Patient states pain is localized to left upper quadrant. Palpable ventral hernia

## 2022-09-13 NOTE — ED PROVIDER NOTE - PROGRESS NOTE DETAILS
surgery pa paged ebony cherry called, concerned for possible incarcerated hernia, pending labs and ct

## 2022-09-13 NOTE — H&P ADULT - NSHPLABSRESULTS_GEN_ALL_CORE
LABS:                        13.5   16.96 )-----------( 401      ( 13 Sep 2022 12:50 )             41.2     13 Sep 2022 12:50    139    |  106    |  8      ----------------------------<  223    2.8     |  19     |  0.89     Ca    9.1        13 Sep 2022 12:50    TPro  8.2    /  Alb  3.6    /  TBili  0.4    /  DBili  x      /  AST  28     /  ALT  36     /  AlkPhos  81     13 Sep 2022 12:50    PT/INR - ( 13 Sep 2022 14:52 )   PT: 13.2 sec;   INR: 1.10 ratio         PTT - ( 13 Sep 2022 14:52 )  PTT:27.4 sec    Antibody Screen: NEG (09.13.22 @ 14:52)   Activated Partial Thromboplastin Time: 27.4: The recommended therapeutic heparin range (full dose) is 58-99   Prothrombin Time and INR, Plasma (09.13.22 @ 14:52)   Prothrombin Time, Plasma: 13.2: Effective February 23,2022, the reference range has changed. sec   ABO RH Interpretation: B POS (09.13.22 @ 14:52)     Flu With COVID-19 By EULOGIO (09.13.22 @ 12:50)   SARS-CoV-2 Result: NotDetec: EUA/IVD   Influenza A Result: NotDetec: EUA/IVD   Influenza B Result: NotDetec: EUA/IVD   HCG Quantitative, Serum: <1: HCG-Quantitative test interpretations: This test is intended only for the   Lactate, Blood: 4.9: TYPE:(C=Critical, N=Notification, A=Abnormal) C       < from: CT Abdomen and Pelvis w/ IV Cont (09.13.22 @ 14:17) >    Large complex ventral hernia with associated closed loop obstructed small   bowel with concern for small bowel ischemia. Dr. Sierra this finding   with Dr. Cates from ER on 9/13/2022 at 2:35PM, with read back.    Further progressive enlargement of left-sided uterine fibroid, measuring   approximately 7.5 cm with apparent internal heterogeneity / degeneration,   in comparison with 1/4/2022 CT. Neoplastic degeneration cannot be   excluded, as suggested on prior CT. Recommend GYN evaluation and pelvic   MRI for further characterization. Dr. Sierra this finding with Dr. Cates   from ER on 9/13/2022 at 2:35PM, with read back.    < end of copied text >        < end of copied text >

## 2022-09-13 NOTE — ED PROVIDER NOTE - OBJECTIVE STATEMENT
40 yo female w/PMH of DM, HTN, presents to the ED, with her niece at bedside, for abdominal pain w/ N/V that started this morning at 9 am. Pt and her family are Greek speaking. Pt had x2 episodes of vomiting. PSHx of x2 ventral hernia repair surgery. Niece states the pain started suddenly. No allergies to any meds. Niece states pt was unable to stand up due to the pain.

## 2022-09-13 NOTE — ED ADULT TRIAGE NOTE - CHIEF COMPLAINT QUOTE
Detail Level: Detailed
Quality 431: Preventive Care And Screening: Unhealthy Alcohol Use - Screening: Patient not identified as an unhealthy alcohol user when screened for unhealthy alcohol use using a systematic screening method
Quality 130: Documentation Of Current Medications In The Medical Record: Current Medications Documented
Quality 226: Preventive Care And Screening: Tobacco Use: Screening And Cessation Intervention: Patient screened for tobacco use and is an ex/non-smoker
Quality 110: Preventive Care And Screening: Influenza Immunization: Influenza Immunization previously received during influenza season
patient BIBA c/o of abdominal pain with N/V started today, denied chest pain c/o of back pain also

## 2022-09-13 NOTE — H&P ADULT - ASSESSMENT
41 year old female with large complex ventral hernia and associated closed loop SBO.     NG tube placed with 150 cc of clear fluid  admit to Dr. Stanley  emergency dx lap/ ex lap/ possible bowel resection- consent obtained in Lao with assistance of  and  at bedside.   npo  abx on call to or  repeat labs  riss  scd with lovenox  incentive spirometry  examined with Dr. Stanley.

## 2022-09-13 NOTE — PATIENT PROFILE ADULT - FUNCTIONAL ASSESSMENT - DAILY ACTIVITY 3.
Christi Leyden  44 y.o. female  1971  [id number]  [facility]  Progress Note     Encounter Date: 4/27/2022    Assessment and Plan:     Encounter Diagnoses     ICD-10-CM ICD-9-CM   1. Type 2 diabetes mellitus with hyperglycemia, without long-term current use of insulin (HCC)  E11.65 250.00     790.29   2. Type 2 diabetes mellitus without complication, without long-term current use of insulin (HCC)  E11.9 250.00   3. Moderate episode of recurrent major depressive disorder ([facility])  F33.1 296.32       1. Type 2 diabetes mellitus with hyperglycemia, without long-term current use of insulin ([facility] [address]  To see diabetic ed  Diet again discussed  Exercise discussed  Doing well not eating late at night  - REFERRAL TO DIABETIC EDUCATION; Standing    2. Type 2 diabetes mellitus without complication, without long-term current use of insulin ([facility])  Resume Victoza  - liraglutide (Victoza 3-William) 0.6 mg/0.1 mL (18 mg/3 mL) pnij; 1.8 mg by SubCUTAneous route daily. Dispense: 9 mL; Refill: 1  - Insulin Needles, Disposable, (Kalyn Pen Needle) 32 gauge x 5/32\" ndle; For daily use with victoza. Dispense: 100 Pen Needle; Refill: 1    3. Moderate episode of recurrent major depressive disorder ([facility] [address]  States she is doing better with mood on Wellbutrin. Cannot tell yet if it has helped with appetite. Anniversary of granddaughter's death was difficult. Most of today's visit discussing getting DM back under control with diet and meds and discussing emotional state. I have discussed the diagnosis with the patient and the intended plan as seen in the above orders. she has expressed understanding. The patient has received an after-visit summary and questions were answered concerning future plans. I have discussed medication side effects and warnings with the patient as well.     Electronically Signed: Bonita Shearer MD    Current Medications after this visit     Current Outpatient Medications Medication Sig    liraglutide (Victoza 3-William) 0.6 mg/0.1 mL (18 mg/3 mL) pnij 1.8 mg by SubCUTAneous route daily.  Insulin Needles, Disposable, (Kalyn Pen Needle) 32 gauge x 5/32\" ndle For daily use with victoza.  citalopram (CELEXA) 40 mg tablet TAKE 1 TABLET BY MOUTH DAILY    buPROPion SR (WELLBUTRIN SR) 100 mg SR tablet Take 1 Tablet by mouth two (2) times a day.  flash glucose sensor (FreeStyle Denisse 14 Day Sensor) kit 1 Units by Does Not Apply route Once every 2 weeks.  mirabegron ER (MYRBETRIQ) 25 mg ER tablet Take 25 mg by mouth daily.  estradioL (ESTRACE) 0.01 % (0.1 mg/gram) vaginal cream Insert 2 g into vagina daily.  glipiZIDE (GLUCOTROL) 10 mg tablet Take 1 Tablet by mouth two (2) times a day.  valsartan-hydroCHLOROthiazide (DIOVAN-HCT) 80-12.5 mg per tablet Take 1 Tablet by mouth daily.  omeprazole (PRILOSEC) 20 mg capsule Take 1 Capsule by mouth daily.  albuterol (PROVENTIL HFA, VENTOLIN HFA, PROAIR HFA) 90 mcg/actuation inhaler Take 1 Puff by inhalation every four (4) hours as needed for Wheezing. Dispense 8g inhaler.  ALPRAZolam (XANAX) 0.5 mg tablet Take 1 Tab by mouth nightly as needed for Anxiety. Max Daily Amount: 0.5 mg. No current facility-administered medications for this visit. Medications Discontinued During This Encounter   Medication Reason    Insulin Needles, Disposable, (Kalyn Pen Needle) 32 gauge x 5/32\" ndle REORDER    liraglutide (Victoza 3-William) 0.6 mg/0.1 mL (18 mg/3 mL) pnij REORDER     ~~~~~~~~~~~~~~~~~~~~~~~~~~~~~~~~~~~~~~~~~~~~~~~~~~~~~~~~~~~    Chief Complaint   Patient presents with    Labs     Lab f/u visit        History provided by patient  History of Present Illness   Darinel Cee is a 44 y.o. female who presents to clinic today for:  Labs (Lab f/u visit )    DM out of control  Now working on diet and now longer eating after dinner.   A1c 10.4    Willing to go back on Victoza  Cannot tolerate Jardiance or Metformin    Emotionally doing better on wellbutrin  If continues to do well, may DC citalopram in future. Health Maintenance  Completed HM gaps at today's visit  Health Maintenance Due   Topic Date Due    Colorectal Cancer Screening Combo  Never done    Shingrix Vaccine Age 50> (1 of 2) Never done    Breast Cancer Screen Mammogram  Never done    Pneumococcal 0-64 years (2 - PCV) 09/10/2020    COVID-19 Vaccine (3 - Booster for Pfizer series) 09/08/2021     Review of Systems   Review of Systems   Constitutional: Positive for weight loss. Negative for fever. Respiratory: Negative for cough and shortness of breath. Cardiovascular: Negative for chest pain and palpitations. Gastrointestinal: Negative. Genitourinary: Negative. Psychiatric/Behavioral: Negative. Vitals/Objective:     Vitals:    04/27/22 1127   BP: 136/74   Pulse: 89   Resp: 20   Temp: 97.9 °F (36.6 °C)   TempSrc: Temporal   SpO2: 98%   Weight: 277 lb 9.6 oz (125.9 kg)   Height: 5' 5\" (1.651 m)     Body mass index is 46.2 kg/m². Wt Readings from Last 3 Encounters:   04/27/22 277 lb 9.6 oz (125.9 kg)   04/18/22 280 lb 12.8 oz (127.4 kg)   10/01/21 270 lb 3.2 oz (122.6 kg)         Objective  Physical Exam  General: Patient alert and oriented and in NAD  Skin: No rashes or lesions noted on exposed skin  Neuro: AAOx3, normal gait and speech. No gross neurologic deficits. Psych: Appropriate mood and affect, no homicidal or suicidal ideation, no obsessions, delusions or hallucinations, normal psychomotor status. No results found for this or any previous visit (from the past 24 hour(s)). Disposition     Follow-up and Dispositions  ·   Return in about 3 months (around 7/27/2022) for Blood pressure follow up, Diabetes follow up, Medication follow up.        Future Appointments   Date Time Provider Jackie Silvina   5/23/2022  9:00 AM Bulmaro Menjivar MD [facility]       History   Patient's past medical, surgical and family histories were reviewed and updated.     Past Medical History:   Diagnosis Date    Anxiety and depression     GERD (gastroesophageal reflux disease)     HTN (hypertension)     Hyperlipemia     Type 2 diabetes mellitus ([facility] [address]      Past Surgical History:   Procedure Laterality Date    HX HYSTERECTOMY       Family History   Problem Relation Age of Onset    Stroke Mother     Diabetes Mother    [address] [facility] Arthritis-rheumatoid Mother     Prostate Cancer Father     COPD Father     Diabetes Father     Colon Cancer Maternal Aunt      Social History     Tobacco Use    Smoking status: Current Every Day Smoker     Packs/day: 0.50     Types: Cigarettes    Smokeless tobacco: Never Used   Vaping Use    Vaping Use: Never used   Substance Use Topics    Alcohol use: Yes     Comment: ocasionally     Drug use: No       Allergies     Allergies   Allergen Reactions    Flomax [Tamsulosin] Shortness of Breath    Jardiance [Empagliflozin] Other (comments)     Burning perineal pain and dysuria    Lavender (Lavandula Angustifolia) Hives    Metformin Diarrhea    Raspberry Unknown (comments) 1 = Total assistance

## 2022-09-14 LAB
A1C WITH ESTIMATED AVERAGE GLUCOSE RESULT: 6.3 % — HIGH (ref 4–5.6)
ANION GAP SERPL CALC-SCNC: 7 MMOL/L — SIGNIFICANT CHANGE UP (ref 5–17)
BUN SERPL-MCNC: 4 MG/DL — LOW (ref 7–23)
CALCIUM SERPL-MCNC: 8.4 MG/DL — LOW (ref 8.5–10.1)
CHLORIDE SERPL-SCNC: 104 MMOL/L — SIGNIFICANT CHANGE UP (ref 96–108)
CO2 SERPL-SCNC: 25 MMOL/L — SIGNIFICANT CHANGE UP (ref 22–31)
CREAT SERPL-MCNC: 0.58 MG/DL — SIGNIFICANT CHANGE UP (ref 0.5–1.3)
CULTURE RESULTS: SIGNIFICANT CHANGE UP
EGFR: 115 ML/MIN/1.73M2 — SIGNIFICANT CHANGE UP
ESTIMATED AVERAGE GLUCOSE: 134 MG/DL — HIGH (ref 68–114)
GLUCOSE BLDC GLUCOMTR-MCNC: 139 MG/DL — HIGH (ref 70–99)
GLUCOSE BLDC GLUCOMTR-MCNC: 159 MG/DL — HIGH (ref 70–99)
GLUCOSE BLDC GLUCOMTR-MCNC: 161 MG/DL — HIGH (ref 70–99)
GLUCOSE BLDC GLUCOMTR-MCNC: 170 MG/DL — HIGH (ref 70–99)
GLUCOSE SERPL-MCNC: 164 MG/DL — HIGH (ref 70–99)
HCT VFR BLD CALC: 38.8 % — SIGNIFICANT CHANGE UP (ref 34.5–45)
HGB BLD-MCNC: 12.6 G/DL — SIGNIFICANT CHANGE UP (ref 11.5–15.5)
LACTATE SERPL-SCNC: 1.5 MMOL/L — SIGNIFICANT CHANGE UP (ref 0.7–2)
MAGNESIUM SERPL-MCNC: 1.9 MG/DL — SIGNIFICANT CHANGE UP (ref 1.6–2.6)
MCHC RBC-ENTMCNC: 25.1 PG — LOW (ref 27–34)
MCHC RBC-ENTMCNC: 32.5 G/DL — SIGNIFICANT CHANGE UP (ref 32–36)
MCV RBC AUTO: 77.3 FL — LOW (ref 80–100)
NRBC # BLD: 0 /100 WBCS — SIGNIFICANT CHANGE UP (ref 0–0)
PHOSPHATE SERPL-MCNC: 2.6 MG/DL — SIGNIFICANT CHANGE UP (ref 2.5–4.5)
PLATELET # BLD AUTO: 354 K/UL — SIGNIFICANT CHANGE UP (ref 150–400)
POTASSIUM SERPL-MCNC: 3.5 MMOL/L — SIGNIFICANT CHANGE UP (ref 3.5–5.3)
POTASSIUM SERPL-SCNC: 3.5 MMOL/L — SIGNIFICANT CHANGE UP (ref 3.5–5.3)
RBC # BLD: 5.02 M/UL — SIGNIFICANT CHANGE UP (ref 3.8–5.2)
RBC # FLD: 15.1 % — HIGH (ref 10.3–14.5)
SODIUM SERPL-SCNC: 136 MMOL/L — SIGNIFICANT CHANGE UP (ref 135–145)
SPECIMEN SOURCE: SIGNIFICANT CHANGE UP
WBC # BLD: 11.58 K/UL — HIGH (ref 3.8–10.5)
WBC # FLD AUTO: 11.58 K/UL — HIGH (ref 3.8–10.5)

## 2022-09-14 RX ORDER — OXYCODONE HYDROCHLORIDE 5 MG/1
5 TABLET ORAL EVERY 6 HOURS
Refills: 0 | Status: DISCONTINUED | OUTPATIENT
Start: 2022-09-14 | End: 2022-09-15

## 2022-09-14 RX ORDER — ACETAMINOPHEN 500 MG
1000 TABLET ORAL ONCE
Refills: 0 | Status: COMPLETED | OUTPATIENT
Start: 2022-09-14 | End: 2022-09-14

## 2022-09-14 RX ORDER — BENZOCAINE AND MENTHOL 5; 1 G/100ML; G/100ML
1 LIQUID ORAL
Refills: 0 | Status: DISCONTINUED | OUTPATIENT
Start: 2022-09-14 | End: 2022-09-15

## 2022-09-14 RX ORDER — KETOROLAC TROMETHAMINE 30 MG/ML
15 SYRINGE (ML) INJECTION EVERY 6 HOURS
Refills: 0 | Status: DISCONTINUED | OUTPATIENT
Start: 2022-09-14 | End: 2022-09-15

## 2022-09-14 RX ORDER — ACETAMINOPHEN 500 MG
1000 TABLET ORAL ONCE
Refills: 0 | Status: COMPLETED | OUTPATIENT
Start: 2022-09-15 | End: 2022-09-15

## 2022-09-14 RX ADMIN — DEXTROSE MONOHYDRATE, SODIUM CHLORIDE, AND POTASSIUM CHLORIDE 125 MILLILITER(S): 50; .745; 4.5 INJECTION, SOLUTION INTRAVENOUS at 05:47

## 2022-09-14 RX ADMIN — Medication 1: at 11:57

## 2022-09-14 RX ADMIN — Medication 400 MILLIGRAM(S): at 13:43

## 2022-09-14 RX ADMIN — ENOXAPARIN SODIUM 40 MILLIGRAM(S): 100 INJECTION SUBCUTANEOUS at 19:13

## 2022-09-14 RX ADMIN — Medication 88 MICROGRAM(S): at 21:37

## 2022-09-14 RX ADMIN — Medication 1: at 08:55

## 2022-09-14 RX ADMIN — Medication 15 MILLIGRAM(S): at 21:37

## 2022-09-14 RX ADMIN — Medication 400 MILLIGRAM(S): at 19:13

## 2022-09-14 RX ADMIN — Medication 100 MILLIEQUIVALENT(S): at 01:02

## 2022-09-14 RX ADMIN — ENOXAPARIN SODIUM 40 MILLIGRAM(S): 100 INJECTION SUBCUTANEOUS at 05:47

## 2022-09-14 RX ADMIN — Medication 15 MILLIGRAM(S): at 22:07

## 2022-09-14 RX ADMIN — Medication 15 MILLIGRAM(S): at 16:47

## 2022-09-14 RX ADMIN — Medication 1: at 16:46

## 2022-09-14 NOTE — PROGRESS NOTE ADULT - NS PANP COMMENT GEN_ALL_CORE FT
Patient seen and examined with pAs  She complaints of pain from NGT  No flatus overnight  NGT with significant  bilious output overnight    Abd is soft, not distended, appropriately tender  No surrounding erythema, penrose in place      - keep NGT at this time, and await GI function  - ambulate as tolerated

## 2022-09-14 NOTE — PHYSICAL THERAPY INITIAL EVALUATION ADULT - CRITERIA FOR SKILLED THERAPEUTIC INTERVENTIONS
Family deferred any further intervention once DC home/impairments found/functional limitations in following categories/risk reduction/prevention/rehab potential/therapy frequency/predicted duration of therapy intervention/anticipated discharge recommendation

## 2022-09-14 NOTE — PHYSICAL THERAPY INITIAL EVALUATION ADULT - PERTINENT HX OF CURRENT PROBLEM, REHAB EVAL
This is the hx of ELLA a 42 y/o female patient who was admitted to [facility] due to complications of Small Bowel Obstruction affecting medical condition and with subsequent affection on functional mobility. CT abdomen 9/13/22: large complex ventral hernia with associated closed loop obstructed small bowel.

## 2022-09-14 NOTE — PHYSICAL THERAPY INITIAL EVALUATION ADULT - ADDITIONAL COMMENTS
as per Niece, who was translating, pt lives in a  with 2 steps to enter with no rails. pt has a flight inside but she stays on the main level. pt was independent with no use of AD.

## 2022-09-14 NOTE — PHYSICAL THERAPY INITIAL EVALUATION ADULT - DID THE PATIENT HAVE SURGERY?
This is the hx of HARVEY. a 42 y/o female patient who was admitted to [facility] due to complications of Small Bowel Obstruction affecting medical condition and with subsequent affection on functional mobility./n/a

## 2022-09-14 NOTE — PHYSICAL THERAPY INITIAL EVALUATION ADULT - GAIT TRAINING, PT EVAL
To be able to perform ambulation independently using no AD for 200 feet, using proper technique using AD, with proper posture and functional distance at home in 2 weeks.

## 2022-09-14 NOTE — PHYSICAL THERAPY INITIAL EVALUATION ADULT - GENERAL OBSERVATIONS, REHAB EVAL
This is the hx of ELLA a 42 y/o female patient who was admitted to [facility] due to complications of Small Bowel Obstruction affecting medical condition and with subsequent affection on functional mobility

## 2022-09-14 NOTE — PROGRESS NOTE ADULT - SUBJECTIVE AND OBJECTIVE BOX
Patient seen and  examined at bedside resting comfortably s/p ventral hernia repair and partial omentectomy  Remains sleepy post operatively but denies any complaints.   NGT and rodríguez catheter in place.  Denies fever, chills, N/V/D, CP, SOB    Vital Signs Last 24 Hrs  T(F): 98.6 (09-13-22 @ 22:45), Max: 98.6 (09-13-22 @ 22:45)  HR: 98 (09-13-22 @ 22:45)  BP: 125/81 (09-13-22 @ 22:45)  RR: 18 (09-13-22 @ 22:45)  SpO2: 95% (09-13-22 @ 22:45)  POCT Blood Glucose.: 162 mg/dL (13 Sep 2022 21:38)    PHYSICAL EXAM:  GENERAL: lethargic, NAD  CHEST/LUNG: Clear to auscultation bilaterally, respirations nonlabored  HEART: Regular rate and rhythm; S1 & S2 appreciated  ABDOMEN: soft, NT/ND. Dressings c/d/i  EXTREMITIES:  no calf tenderness, No edema    I&O's Detail    13 Sep 2022 07:01  -  14 Sep 2022 01:12  --------------------------------------------------------  IN:    Lactated Ringers: 100 mL  Total IN: 100 mL    OUT:    Indwelling Catheter - Urethral (mL): 500 mL  Total OUT: 500 mL    Total NET: -400 mL    LABS:                        13.5   16.96 )-----------( 401      ( 13 Sep 2022 12:50 )             41.2     09-13    139  |  106  |  8   ----------------------------<  223<H>  2.8<LL>   |  19<L>  |  0.89    Ca    9.1      13 Sep 2022 12:50    TPro  8.2  /  Alb  3.6  /  TBili  0.4  /  DBili  x   /  AST  28  /  ALT  36  /  AlkPhos  81  09-13    PT/INR - ( 13 Sep 2022 14:52 )   PT: 13.2 sec;   INR: 1.10 ratio         PTT - ( 13 Sep 2022 14:52 )  PTT:27.4 sec    RADIOLOGY & ADDITIONAL STUDIES:    A/P  41 year old female with large complex ventral hernia and associated closed loop SBO now s/p ventral hernia repair and partial omentectomy    - NPO, IVF  - NGT to be d/c'd in AM; clears later today  - rodríguez to be d/c'd in AM  - analgesia prn  - local wound care per surgery  - DVT ppx, OOB/AAT, IS  - will d/w Dr. Stanley

## 2022-09-15 ENCOUNTER — TRANSCRIPTION ENCOUNTER (OUTPATIENT)
Age: 42
End: 2022-09-15

## 2022-09-15 VITALS
OXYGEN SATURATION: 96 % | HEART RATE: 98 BPM | TEMPERATURE: 98 F | RESPIRATION RATE: 18 BRPM | SYSTOLIC BLOOD PRESSURE: 137 MMHG | DIASTOLIC BLOOD PRESSURE: 64 MMHG

## 2022-09-15 LAB
ANION GAP SERPL CALC-SCNC: 7 MMOL/L — SIGNIFICANT CHANGE UP (ref 5–17)
BASOPHILS # BLD AUTO: 0.04 K/UL — SIGNIFICANT CHANGE UP (ref 0–0.2)
BASOPHILS NFR BLD AUTO: 0.4 % — SIGNIFICANT CHANGE UP (ref 0–2)
BUN SERPL-MCNC: 5 MG/DL — LOW (ref 7–23)
CALCIUM SERPL-MCNC: 8 MG/DL — LOW (ref 8.5–10.1)
CHLORIDE SERPL-SCNC: 107 MMOL/L — SIGNIFICANT CHANGE UP (ref 96–108)
CO2 SERPL-SCNC: 24 MMOL/L — SIGNIFICANT CHANGE UP (ref 22–31)
CREAT SERPL-MCNC: 0.65 MG/DL — SIGNIFICANT CHANGE UP (ref 0.5–1.3)
EGFR: 112 ML/MIN/1.73M2 — SIGNIFICANT CHANGE UP
EOSINOPHIL # BLD AUTO: 0.1 K/UL — SIGNIFICANT CHANGE UP (ref 0–0.5)
EOSINOPHIL NFR BLD AUTO: 0.9 % — SIGNIFICANT CHANGE UP (ref 0–6)
GLUCOSE BLDC GLUCOMTR-MCNC: 118 MG/DL — HIGH (ref 70–99)
GLUCOSE BLDC GLUCOMTR-MCNC: 133 MG/DL — HIGH (ref 70–99)
GLUCOSE BLDC GLUCOMTR-MCNC: 151 MG/DL — HIGH (ref 70–99)
GLUCOSE SERPL-MCNC: 135 MG/DL — HIGH (ref 70–99)
HCT VFR BLD CALC: 34.7 % — SIGNIFICANT CHANGE UP (ref 34.5–45)
HGB BLD-MCNC: 11.2 G/DL — LOW (ref 11.5–15.5)
IMM GRANULOCYTES NFR BLD AUTO: 0.4 % — SIGNIFICANT CHANGE UP (ref 0–1.5)
LYMPHOCYTES # BLD AUTO: 1.98 K/UL — SIGNIFICANT CHANGE UP (ref 1–3.3)
LYMPHOCYTES # BLD AUTO: 18 % — SIGNIFICANT CHANGE UP (ref 13–44)
MAGNESIUM SERPL-MCNC: 2 MG/DL — SIGNIFICANT CHANGE UP (ref 1.6–2.6)
MCHC RBC-ENTMCNC: 25.3 PG — LOW (ref 27–34)
MCHC RBC-ENTMCNC: 32.3 G/DL — SIGNIFICANT CHANGE UP (ref 32–36)
MCV RBC AUTO: 78.5 FL — LOW (ref 80–100)
MONOCYTES # BLD AUTO: 0.99 K/UL — HIGH (ref 0–0.9)
MONOCYTES NFR BLD AUTO: 9 % — SIGNIFICANT CHANGE UP (ref 2–14)
NEUTROPHILS # BLD AUTO: 7.87 K/UL — HIGH (ref 1.8–7.4)
NEUTROPHILS NFR BLD AUTO: 71.3 % — SIGNIFICANT CHANGE UP (ref 43–77)
NRBC # BLD: 0 /100 WBCS — SIGNIFICANT CHANGE UP (ref 0–0)
PHOSPHATE SERPL-MCNC: 2.4 MG/DL — LOW (ref 2.5–4.5)
PLATELET # BLD AUTO: 300 K/UL — SIGNIFICANT CHANGE UP (ref 150–400)
POTASSIUM SERPL-MCNC: 3.4 MMOL/L — LOW (ref 3.5–5.3)
POTASSIUM SERPL-SCNC: 3.4 MMOL/L — LOW (ref 3.5–5.3)
RBC # BLD: 4.42 M/UL — SIGNIFICANT CHANGE UP (ref 3.8–5.2)
RBC # FLD: 15.1 % — HIGH (ref 10.3–14.5)
SODIUM SERPL-SCNC: 138 MMOL/L — SIGNIFICANT CHANGE UP (ref 135–145)
SURGICAL PATHOLOGY STUDY: SIGNIFICANT CHANGE UP
WBC # BLD: 11.02 K/UL — HIGH (ref 3.8–10.5)
WBC # FLD AUTO: 11.02 K/UL — HIGH (ref 3.8–10.5)

## 2022-09-15 RX ORDER — POTASSIUM CHLORIDE 20 MEQ
40 PACKET (EA) ORAL ONCE
Refills: 0 | Status: DISCONTINUED | OUTPATIENT
Start: 2022-09-15 | End: 2022-09-15

## 2022-09-15 RX ORDER — ACETAMINOPHEN 500 MG
2 TABLET ORAL
Qty: 0 | Refills: 0 | DISCHARGE

## 2022-09-15 RX ORDER — SODIUM,POTASSIUM PHOSPHATES 278-250MG
1 POWDER IN PACKET (EA) ORAL ONCE
Refills: 0 | Status: DISCONTINUED | OUTPATIENT
Start: 2022-09-15 | End: 2022-09-15

## 2022-09-15 RX ORDER — OXYCODONE HYDROCHLORIDE 5 MG/1
1 TABLET ORAL
Qty: 20 | Refills: 0
Start: 2022-09-15 | End: 2022-09-19

## 2022-09-15 RX ORDER — IBUPROFEN 200 MG
1 TABLET ORAL
Qty: 0 | Refills: 0 | DISCHARGE

## 2022-09-15 RX ADMIN — Medication 1000 MILLIGRAM(S): at 06:47

## 2022-09-15 RX ADMIN — Medication 1000 MILLIGRAM(S): at 01:37

## 2022-09-15 RX ADMIN — ENOXAPARIN SODIUM 40 MILLIGRAM(S): 100 INJECTION SUBCUTANEOUS at 06:17

## 2022-09-15 RX ADMIN — DEXTROSE MONOHYDRATE, SODIUM CHLORIDE, AND POTASSIUM CHLORIDE 125 MILLILITER(S): 50; .745; 4.5 INJECTION, SOLUTION INTRAVENOUS at 01:06

## 2022-09-15 RX ADMIN — Medication 15 MILLIGRAM(S): at 18:46

## 2022-09-15 RX ADMIN — Medication 400 MILLIGRAM(S): at 06:17

## 2022-09-15 RX ADMIN — Medication 400 MILLIGRAM(S): at 01:06

## 2022-09-15 RX ADMIN — Medication 15 MILLIGRAM(S): at 04:34

## 2022-09-15 RX ADMIN — Medication 15 MILLIGRAM(S): at 05:04

## 2022-09-15 RX ADMIN — Medication 15 MILLIGRAM(S): at 09:38

## 2022-09-15 NOTE — DISCHARGE NOTE PROVIDER - NSDCCPCAREPLAN_GEN_ALL_CORE_FT
PRINCIPAL DISCHARGE DIAGNOSIS  Diagnosis: Small bowel obstruction  Assessment and Plan of Treatment:       SECONDARY DISCHARGE DIAGNOSES  Diagnosis: Ventral hernia  Assessment and Plan of Treatment:

## 2022-09-15 NOTE — PROGRESS NOTE ADULT - SUBJECTIVE AND OBJECTIVE BOX
HPI: 41 year old female pod #2 s/p ventral hernia repair and partial omentectomy no overnight events    Vital Signs Last 24 Hrs  T(C): 36.8 (15 Sep 2022 05:10), Max: 36.9 (14 Sep 2022 07:45)  T(F): 98.3 (15 Sep 2022 05:10), Max: 98.5 (14 Sep 2022 07:45)  HR: 83 (15 Sep 2022 05:10) (82 - 107)  BP: 111/65 (15 Sep 2022 05:10) (111/65 - 136/86)  BP(mean): --  RR: 18 (15 Sep 2022 05:10) (17 - 21)  SpO2: 95% (15 Sep 2022 05:10) (95% - 97%)    Parameters below as of 15 Sep 2022 05:10  Patient On (Oxygen Delivery Method): room air        MEDICATIONS  (STANDING):  dextrose 5% + sodium chloride 0.45% with potassium chloride 20 mEq/L 1000 milliLiter(s) (125 mL/Hr) IV Continuous <Continuous>  dextrose 5%. 1000 milliLiter(s) (50 mL/Hr) IV Continuous <Continuous>  dextrose 5%. 1000 milliLiter(s) (100 mL/Hr) IV Continuous <Continuous>  dextrose 50% Injectable 25 Gram(s) IV Push once  dextrose 50% Injectable 12.5 Gram(s) IV Push once  dextrose 50% Injectable 25 Gram(s) IV Push once  enoxaparin Injectable 40 milliGRAM(s) SubCutaneous every 12 hours  glucagon  Injectable 1 milliGRAM(s) IntraMuscular once  influenza   Vaccine 0.5 milliLiter(s) IntraMuscular once  insulin lispro (ADMELOG) corrective regimen sliding scale   SubCutaneous three times a day before meals  insulin lispro (ADMELOG) corrective regimen sliding scale   SubCutaneous at bedtime  ketorolac   Injectable 15 milliGRAM(s) IV Push every 6 hours  levothyroxine Injectable 88 MICROGram(s) IV Push at bedtime    MEDICATIONS  (PRN):  benzocaine 15 mG/menthol 3.6 mG Lozenge 1 Lozenge Oral five times a day PRN Sore Throat  dextrose Oral Gel 15 Gram(s) Oral once PRN Blood Glucose LESS THAN 70 milliGRAM(s)/deciliter  oxyCODONE    IR 5 milliGRAM(s) Oral every 6 hours PRN Moderate Pain (4 - 6)      PHYSICAL EXAM:      Constitutional: awake and alert.  HEENT: PERRLA, EOMI,   Neck: Supple.  Respiratory: Breath sounds are clear bilaterally  Cardiovascular: S1 and S2, regular  Gastrointestinal: soft, nontender  Extremities:  no edema  Musculoskeletal: no joint swelling/tenderness, no abnormal movements  Skin: No rashes        LABS:                         12.6   11.58 )-----------( 354      ( 14 Sep 2022 05:43 )             38.8     09-14    136  |  104  |  4<L>  ----------------------------<  164<H>  3.5   |  25  |  0.58    Ca    8.4<L>      14 Sep 2022 05:43  Phos  2.6     09-14  Mg     1.9     09-14    TPro  8.2  /  Alb  3.6  /  TBili  0.4  /  DBili  x   /  AST  28  /  ALT  36  /  AlkPhos  81  09-13    LIVER FUNCTIONS - ( 13 Sep 2022 12:50 )  Alb: 3.6 g/dL / Pro: 8.2 gm/dL / ALK PHOS: 81 U/L / ALT: 36 U/L / AST: 28 U/L / GGT: x

## 2022-09-15 NOTE — DISCHARGE NOTE PROVIDER - NSDCCPTREATMENT_GEN_ALL_CORE_FT
PRINCIPAL PROCEDURE  Procedure: Repair of incarcerated ventral hernia  Findings and Treatment:       SECONDARY PROCEDURE  Procedure: Laparotomy for bowel obstruction  Findings and Treatment:

## 2022-09-15 NOTE — DISCHARGE NOTE PROVIDER - NSDCMRMEDTOKEN_GEN_ALL_CORE_FT
ferrous sulfate 325 mg (65 mg elemental iron) oral tablet: 1 tab(s) orally 3 times a day  hydroCHLOROthiazide 12.5 mg oral capsule: 1 cap(s) orally once a day  ibuprofen 600 mg oral tablet: 1 tab(s) orally every 6 hours  lisinopril 5 mg oral tablet: 1 tab(s) orally once a day  metFORMIN 1000 mg oral tablet: 1 tab(s) orally 2 times a day  Norvasc 10 mg oral tablet: 1 tab(s) orally once a day  oxyCODONE 5 mg oral tablet: 1 tab(s) orally every 6 hours, As Needed - for severe pain MDD:4   Synthroid 125 mcg (0.125 mg) oral tablet: 1 tab(s) orally once a day  Tylenol 500 mg oral tablet: 2 tab(s) orally every 6 hours  Vitamin B-12 1000 mcg oral tablet: 1 tab(s) orally once a day  Vitamin D3 1250 mcg (50,000 intl units) oral capsule: 1 cap(s) orally once a week

## 2022-09-15 NOTE — DISCHARGE NOTE NURSING/CASE MANAGEMENT/SOCIAL WORK - PATIENT PORTAL LINK FT
You can access the FollowMyHealth Patient Portal offered by [facility] by registering at the following website: [email or web address]. By joining [employer]’s FollowMyHealth portal, you will also be able to view your health information using other applications (apps) compatible with our system.

## 2022-09-15 NOTE — CHART NOTE - NSCHARTNOTEFT_GEN_A_CORE
Surgery PA Note    Patient seen and examined at bedside resting comfortably. Patient is tolerating her liquid diet, will advance to soft, consistent carbohydrate diet.  Per discussion with patient and family (patient requested family translate,) patient is aware of uterine fibroid and had it biopsied outpatient. Per patient she is being managed by a physician at [facility] in [place], and has a follow up appointment set up.     Will continue to monitor patient, and possible DC in AM

## 2022-09-15 NOTE — DISCHARGE NOTE PROVIDER - CARE PROVIDER_API CALL
Yandel Stanley)  Surgery  [address] [place] [address]  Phone: [phone]  Fax: [phone]  Follow Up Time: 2 weeks

## 2022-09-15 NOTE — DISCHARGE NOTE NURSING/CASE MANAGEMENT/SOCIAL WORK - NSDCPEFALRISK_GEN_ALL_CORE
For information on Fall & Injury Prevention, visit: [email or web address] OR  [email or web address] OR  https://www.cdc.gov/steadi/patient.html

## 2022-09-15 NOTE — DISCHARGE NOTE PROVIDER - HOSPITAL COURSE
41 year old female woke with abdominal pain which struck her suddenly at 10:30am, endorses had BM yesterday but none today and has been vomiting. CT scan shows closed loop sbo and ventral hernia. Hx of ventral hernia repair, cholecystectomy and . Hx of DM and HTN. Patient states pain is localized to left upper quadrant with palpable ventral hernia. Patient admitted to surgery and taken to OR emergently for a repair of incarcerated ventral hernia. Post operative course unremarkable, patient stable for discharge from surgical standpoint with penrose drain

## 2022-09-19 PROBLEM — E11.9 TYPE 2 DIABETES MELLITUS WITHOUT COMPLICATIONS: Chronic | Status: ACTIVE | Noted: 2022-09-13

## 2022-09-19 PROBLEM — Z00.00 ENCOUNTER FOR PREVENTIVE HEALTH EXAMINATION: Status: ACTIVE | Noted: 2022-09-19

## 2022-09-19 PROBLEM — I10 ESSENTIAL (PRIMARY) HYPERTENSION: Chronic | Status: ACTIVE | Noted: 2022-09-13

## 2022-09-20 DIAGNOSIS — K43.0 INCISIONAL HERNIA WITH OBSTRUCTION, WITHOUT GANGRENE: ICD-10-CM

## 2022-09-20 DIAGNOSIS — E87.6 HYPOKALEMIA: ICD-10-CM

## 2022-09-20 DIAGNOSIS — I10 ESSENTIAL (PRIMARY) HYPERTENSION: ICD-10-CM

## 2022-09-20 DIAGNOSIS — E83.39 OTHER DISORDERS OF PHOSPHORUS METABOLISM: ICD-10-CM

## 2022-09-20 DIAGNOSIS — D25.9 LEIOMYOMA OF UTERUS, UNSPECIFIED: ICD-10-CM

## 2022-09-20 DIAGNOSIS — Z90.49 ACQUIRED ABSENCE OF OTHER SPECIFIED PARTS OF DIGESTIVE TRACT: ICD-10-CM

## 2022-09-20 DIAGNOSIS — E11.9 TYPE 2 DIABETES MELLITUS WITHOUT COMPLICATIONS: ICD-10-CM

## 2022-09-21 ENCOUNTER — APPOINTMENT (OUTPATIENT)
Dept: BARIATRICS | Facility: CLINIC | Age: 42
End: 2022-09-21

## 2022-09-21 VITALS
HEART RATE: 101 BPM | TEMPERATURE: 98 F | DIASTOLIC BLOOD PRESSURE: 81 MMHG | BODY MASS INDEX: 42.49 KG/M2 | SYSTOLIC BLOOD PRESSURE: 138 MMHG | OXYGEN SATURATION: 97 % | HEIGHT: 65 IN | WEIGHT: 255 LBS

## 2022-09-21 PROCEDURE — 99024 POSTOP FOLLOW-UP VISIT: CPT

## 2022-09-28 ENCOUNTER — APPOINTMENT (OUTPATIENT)
Dept: BARIATRICS | Facility: CLINIC | Age: 42
End: 2022-09-28

## 2022-09-28 VITALS
SYSTOLIC BLOOD PRESSURE: 120 MMHG | BODY MASS INDEX: 41.06 KG/M2 | HEIGHT: 65 IN | TEMPERATURE: 97.9 F | DIASTOLIC BLOOD PRESSURE: 79 MMHG | OXYGEN SATURATION: 98 % | WEIGHT: 246.44 LBS | HEART RATE: 105 BPM

## 2022-09-28 DIAGNOSIS — L08.9 OTHER INJURY OF UNSPECIFIED BODY REGION, INITIAL ENCOUNTER: ICD-10-CM

## 2022-09-28 DIAGNOSIS — T14.8XXA OTHER INJURY OF UNSPECIFIED BODY REGION, INITIAL ENCOUNTER: ICD-10-CM

## 2022-09-28 PROCEDURE — 99024 POSTOP FOLLOW-UP VISIT: CPT

## 2022-09-28 RX ORDER — CIPROFLOXACIN HYDROCHLORIDE 500 MG/1
500 TABLET, FILM COATED ORAL
Qty: 28 | Refills: 0 | Status: ACTIVE | COMMUNITY
Start: 2022-09-28 | End: 1900-01-01

## 2022-09-28 RX ORDER — METRONIDAZOLE 500 MG/1
500 TABLET ORAL 3 TIMES DAILY
Qty: 42 | Refills: 0 | Status: ACTIVE | COMMUNITY
Start: 2022-09-28 | End: 1900-01-01

## 2022-09-28 NOTE — PHYSICAL EXAM
[de-identified] : Soft, tender appropriate\par Drain in LLQ with malodorous drainage\par Staple line intact, no erythema

## 2022-09-28 NOTE — PLAN
[FreeTextEntry1] : PO cipro flagyl for 2 weeks\par Maintain drain for now\par Staples removed bedside\par F/U in 2 weeks

## 2022-09-28 NOTE — HISTORY OF PRESENT ILLNESS
[de-identified] : Patient is a 42 y/o woman with a h/o recurrent incisional hernia s/p ex lap MALINI and repair of incacerated incisional hernia with SBO on 9/13/22.  She complains of pain at incision.  She also states she has a good amount of drainage from the penrose drain site. [de-identified] : Complains of increased drainage that is malodorous

## 2022-09-28 NOTE — HISTORY OF PRESENT ILLNESS
[de-identified] : Patient is a 40 y/o woman with a h/o recurrent incisional hernia s/p ex lap MALINI and repair of incacerated incisional hernia with SBO on 9/13/22.  She complains of pain at incision.  She also states she has a good amount of drainage from the penrose drain site.

## 2022-09-30 ENCOUNTER — EMERGENCY (EMERGENCY)
Facility: HOSPITAL | Age: 42
LOS: 0 days | Discharge: HOME | End: 2022-09-30
Attending: EMERGENCY MEDICINE

## 2022-09-30 VITALS
DIASTOLIC BLOOD PRESSURE: 80 MMHG | SYSTOLIC BLOOD PRESSURE: 122 MMHG | OXYGEN SATURATION: 96 % | TEMPERATURE: 98 F | HEART RATE: 82 BPM | RESPIRATION RATE: 17 BRPM

## 2022-09-30 VITALS
HEIGHT: 64 IN | SYSTOLIC BLOOD PRESSURE: 170 MMHG | TEMPERATURE: 97 F | OXYGEN SATURATION: 100 % | RESPIRATION RATE: 20 BRPM | WEIGHT: 246.04 LBS | HEART RATE: 97 BPM | DIASTOLIC BLOOD PRESSURE: 73 MMHG

## 2022-09-30 DIAGNOSIS — Z79.84 LONG TERM (CURRENT) USE OF ORAL HYPOGLYCEMIC DRUGS: ICD-10-CM

## 2022-09-30 DIAGNOSIS — Z90.49 ACQUIRED ABSENCE OF OTHER SPECIFIED PARTS OF DIGESTIVE TRACT: Chronic | ICD-10-CM

## 2022-09-30 DIAGNOSIS — Z98.891 HISTORY OF UTERINE SCAR FROM PREVIOUS SURGERY: Chronic | ICD-10-CM

## 2022-09-30 DIAGNOSIS — Z90.49 ACQUIRED ABSENCE OF OTHER SPECIFIED PARTS OF DIGESTIVE TRACT: ICD-10-CM

## 2022-09-30 DIAGNOSIS — Z98.890 OTHER SPECIFIED POSTPROCEDURAL STATES: ICD-10-CM

## 2022-09-30 DIAGNOSIS — E11.9 TYPE 2 DIABETES MELLITUS WITHOUT COMPLICATIONS: ICD-10-CM

## 2022-09-30 DIAGNOSIS — R68.83 CHILLS (WITHOUT FEVER): ICD-10-CM

## 2022-09-30 DIAGNOSIS — R10.32 LEFT LOWER QUADRANT PAIN: ICD-10-CM

## 2022-09-30 DIAGNOSIS — Z20.822 CONTACT WITH AND (SUSPECTED) EXPOSURE TO COVID-19: ICD-10-CM

## 2022-09-30 DIAGNOSIS — Z98.890 OTHER SPECIFIED POSTPROCEDURAL STATES: Chronic | ICD-10-CM

## 2022-09-30 DIAGNOSIS — R11.10 VOMITING, UNSPECIFIED: ICD-10-CM

## 2022-09-30 DIAGNOSIS — I10 ESSENTIAL (PRIMARY) HYPERTENSION: ICD-10-CM

## 2022-09-30 LAB
ALBUMIN SERPL ELPH-MCNC: 2.3 G/DL — LOW (ref 3.3–5)
ALP SERPL-CCNC: 141 U/L — HIGH (ref 40–120)
ALT FLD-CCNC: 73 U/L — SIGNIFICANT CHANGE UP (ref 12–78)
ANION GAP SERPL CALC-SCNC: 7 MMOL/L — SIGNIFICANT CHANGE UP (ref 5–17)
APPEARANCE UR: CLEAR — SIGNIFICANT CHANGE UP
APTT BLD: 31.8 SEC — SIGNIFICANT CHANGE UP (ref 27.5–35.5)
AST SERPL-CCNC: 29 U/L — SIGNIFICANT CHANGE UP (ref 15–37)
BACTERIA # UR AUTO: ABNORMAL
BASOPHILS # BLD AUTO: 0.07 K/UL — SIGNIFICANT CHANGE UP (ref 0–0.2)
BASOPHILS NFR BLD AUTO: 0.5 % — SIGNIFICANT CHANGE UP (ref 0–2)
BILIRUB SERPL-MCNC: 0.3 MG/DL — SIGNIFICANT CHANGE UP (ref 0.2–1.2)
BILIRUB UR-MCNC: NEGATIVE — SIGNIFICANT CHANGE UP
BUN SERPL-MCNC: 8 MG/DL — SIGNIFICANT CHANGE UP (ref 7–23)
CALCIUM SERPL-MCNC: 9.3 MG/DL — SIGNIFICANT CHANGE UP (ref 8.5–10.1)
CHLORIDE SERPL-SCNC: 99 MMOL/L — SIGNIFICANT CHANGE UP (ref 96–108)
CO2 SERPL-SCNC: 29 MMOL/L — SIGNIFICANT CHANGE UP (ref 22–31)
COLOR SPEC: YELLOW — SIGNIFICANT CHANGE UP
CREAT SERPL-MCNC: 0.63 MG/DL — SIGNIFICANT CHANGE UP (ref 0.5–1.3)
DIFF PNL FLD: NEGATIVE — SIGNIFICANT CHANGE UP
EGFR: 113 ML/MIN/1.73M2 — SIGNIFICANT CHANGE UP
EOSINOPHIL # BLD AUTO: 0.18 K/UL — SIGNIFICANT CHANGE UP (ref 0–0.5)
EOSINOPHIL NFR BLD AUTO: 1.4 % — SIGNIFICANT CHANGE UP (ref 0–6)
EPI CELLS # UR: SIGNIFICANT CHANGE UP
FLUAV AG NPH QL: SIGNIFICANT CHANGE UP
FLUBV AG NPH QL: SIGNIFICANT CHANGE UP
GLUCOSE SERPL-MCNC: 132 MG/DL — HIGH (ref 70–99)
GLUCOSE UR QL: NEGATIVE MG/DL — SIGNIFICANT CHANGE UP
HCG SERPL-ACNC: <1 MIU/ML — SIGNIFICANT CHANGE UP
HCT VFR BLD CALC: 33.4 % — LOW (ref 34.5–45)
HGB BLD-MCNC: 10.8 G/DL — LOW (ref 11.5–15.5)
IMM GRANULOCYTES NFR BLD AUTO: 0.5 % — SIGNIFICANT CHANGE UP (ref 0–0.9)
INR BLD: 1.37 RATIO — HIGH (ref 0.88–1.16)
KETONES UR-MCNC: NEGATIVE — SIGNIFICANT CHANGE UP
LACTATE SERPL-SCNC: 1.3 MMOL/L — SIGNIFICANT CHANGE UP (ref 0.7–2)
LEUKOCYTE ESTERASE UR-ACNC: ABNORMAL
LYMPHOCYTES # BLD AUTO: 17.6 % — SIGNIFICANT CHANGE UP (ref 13–44)
LYMPHOCYTES # BLD AUTO: 2.31 K/UL — SIGNIFICANT CHANGE UP (ref 1–3.3)
MCHC RBC-ENTMCNC: 25 PG — LOW (ref 27–34)
MCHC RBC-ENTMCNC: 32.3 G/DL — SIGNIFICANT CHANGE UP (ref 32–36)
MCV RBC AUTO: 77.3 FL — LOW (ref 80–100)
MONOCYTES # BLD AUTO: 0.98 K/UL — HIGH (ref 0–0.9)
MONOCYTES NFR BLD AUTO: 7.5 % — SIGNIFICANT CHANGE UP (ref 2–14)
NEUTROPHILS # BLD AUTO: 9.55 K/UL — HIGH (ref 1.8–7.4)
NEUTROPHILS NFR BLD AUTO: 72.5 % — SIGNIFICANT CHANGE UP (ref 43–77)
NITRITE UR-MCNC: NEGATIVE — SIGNIFICANT CHANGE UP
NRBC # BLD: 0 /100 WBCS — SIGNIFICANT CHANGE UP (ref 0–0)
PH UR: 6 — SIGNIFICANT CHANGE UP (ref 5–8)
PLATELET # BLD AUTO: 633 K/UL — HIGH (ref 150–400)
POTASSIUM SERPL-MCNC: 3.9 MMOL/L — SIGNIFICANT CHANGE UP (ref 3.5–5.3)
POTASSIUM SERPL-SCNC: 3.9 MMOL/L — SIGNIFICANT CHANGE UP (ref 3.5–5.3)
PROT SERPL-MCNC: 7.7 GM/DL — SIGNIFICANT CHANGE UP (ref 6–8.3)
PROT UR-MCNC: 30 MG/DL
PROTHROM AB SERPL-ACNC: 16.3 SEC — HIGH (ref 10.5–13.4)
RBC # BLD: 4.32 M/UL — SIGNIFICANT CHANGE UP (ref 3.8–5.2)
RBC # FLD: 14.2 % — SIGNIFICANT CHANGE UP (ref 10.3–14.5)
RBC CASTS # UR COMP ASSIST: SIGNIFICANT CHANGE UP /HPF (ref 0–4)
SARS-COV-2 RNA SPEC QL NAA+PROBE: SIGNIFICANT CHANGE UP
SODIUM SERPL-SCNC: 135 MMOL/L — SIGNIFICANT CHANGE UP (ref 135–145)
SP GR SPEC: 1.01 — SIGNIFICANT CHANGE UP (ref 1.01–1.02)
UROBILINOGEN FLD QL: NEGATIVE MG/DL — SIGNIFICANT CHANGE UP
WBC # BLD: 13.15 K/UL — HIGH (ref 3.8–10.5)
WBC # FLD AUTO: 13.15 K/UL — HIGH (ref 3.8–10.5)
WBC UR QL: ABNORMAL

## 2022-09-30 PROCEDURE — 99283 EMERGENCY DEPT VISIT LOW MDM: CPT

## 2022-09-30 PROCEDURE — 99285 EMERGENCY DEPT VISIT HI MDM: CPT

## 2022-09-30 PROCEDURE — 74177 CT ABD & PELVIS W/CONTRAST: CPT | Mod: 26,MA

## 2022-09-30 PROCEDURE — 71045 X-RAY EXAM CHEST 1 VIEW: CPT | Mod: 26

## 2022-09-30 RX ORDER — PANTOPRAZOLE SODIUM 20 MG/1
40 TABLET, DELAYED RELEASE ORAL ONCE
Refills: 0 | Status: COMPLETED | OUTPATIENT
Start: 2022-09-30 | End: 2022-09-30

## 2022-09-30 RX ORDER — MORPHINE SULFATE 50 MG/1
4 CAPSULE, EXTENDED RELEASE ORAL ONCE
Refills: 0 | Status: DISCONTINUED | OUTPATIENT
Start: 2022-09-30 | End: 2022-09-30

## 2022-09-30 RX ORDER — ONDANSETRON 8 MG/1
1 TABLET, FILM COATED ORAL
Qty: 9 | Refills: 0
Start: 2022-09-30 | End: 2022-10-02

## 2022-09-30 RX ORDER — ONDANSETRON 8 MG/1
4 TABLET, FILM COATED ORAL ONCE
Refills: 0 | Status: COMPLETED | OUTPATIENT
Start: 2022-09-30 | End: 2022-09-30

## 2022-09-30 RX ORDER — SODIUM CHLORIDE 9 MG/ML
1700 INJECTION, SOLUTION INTRAVENOUS ONCE
Refills: 0 | Status: COMPLETED | OUTPATIENT
Start: 2022-09-30 | End: 2022-09-30

## 2022-09-30 RX ADMIN — Medication 1 TABLET(S): at 18:27

## 2022-09-30 RX ADMIN — SODIUM CHLORIDE 1700 MILLILITER(S): 9 INJECTION, SOLUTION INTRAVENOUS at 13:03

## 2022-09-30 RX ADMIN — ONDANSETRON 4 MILLIGRAM(S): 8 TABLET, FILM COATED ORAL at 13:04

## 2022-09-30 RX ADMIN — PANTOPRAZOLE SODIUM 40 MILLIGRAM(S): 20 TABLET, DELAYED RELEASE ORAL at 13:05

## 2022-09-30 RX ADMIN — MORPHINE SULFATE 4 MILLIGRAM(S): 50 CAPSULE, EXTENDED RELEASE ORAL at 13:04

## 2022-09-30 NOTE — ED ADULT NURSE NOTE - CHIEF COMPLAINT QUOTE
Speaks Selam [facility] [id number] LLQ pain, vomitx1 with chills on antibiotics S/P Hernia surgery 9/13 with a drain to left abd area

## 2022-09-30 NOTE — ED PROVIDER NOTE - OBJECTIVE STATEMENT
41 years old female here s/p left hernia repair on 9/13/22 by Dr. Lizeth augustine c/o left lower abd pain. Pt sts she is not at risk of being pregnant. Pt also c/o chills, Pt denies headache, dizziness, blurred visions, light sensitivities, focal/distal weakness or numbness, neck/back/hips/calfs pain, cough, sob, chest pain, nausea, vomiting, dysuria, vaginal spotting or discharge or irregular bowel movements. 41 years old female here s/p left hernia repair on 9/13/22 by Dr. Lizeth augustine c/o left lower abd pain. Pt sts she is not at risk of being pregnant. Pt also c/o chills, Pt denies headache, dizziness, blurred visions, light sensitivities, focal/distal weakness or numbness, neck/back/hips/calfs pain, cough, sob, chest pain, nausea, vomiting, dysuria, vaginal spotting or discharge or irregular bowel movements. Pt sts she did not take her Lisinpril amlodipine and metformin this morning due to nausea vomiting, Pt is also currently taking cipro and flagyl.

## 2022-09-30 NOTE — ED PROVIDER NOTE - PROGRESS NOTE DETAILS
pt ambulated with normal gaits to and from the bathroom. Pt 's nephew Ajiab [phone] is here and sts pt has an appointment with gyn for the left uterine mass. SX is notified and sts she is coming to see pt. sx consult sts pt can be discharged with augmentin 875 mg bid x 14 days and follow up with Dr. Jeremy augustine. Pt is advised to stop cipro and flagyl

## 2022-09-30 NOTE — ED PROVIDER NOTE - CARE PROVIDER_API CALL
Alexandre Luna)  Surgery  [address] [facility]nd [address]  [place] [id number]  Phone: [phone]  Fax: [phone]  Follow Up Time:

## 2022-09-30 NOTE — ED PROVIDER NOTE - CONSTITUTIONAL, MLM
Well appearing, awake, alert, oriented to person, place, time/situation and in no apparent distress. Speaking in clear full sentences no nasal flaring no shoulders retractions no diaphoresis no active vomiting, appears comfortable lying in the stretcher in a bright light room normal...

## 2022-09-30 NOTE — ED ADULT NURSE NOTE - OBJECTIVE STATEMENT
pt presents to ed a&ox4 monae speaking niece at bedside . pt c/o chills  since kevin night denies fever. pt also repprts abd pain . abd surgery 9/13 . drain in place. yellow perulant drainage noted. no foul odor. hx htn dm

## 2022-09-30 NOTE — ED ADULT TRIAGE NOTE - CHIEF COMPLAINT QUOTE
Speaks Selam [facility] [id number] LLQ pain, vomitx1 with chills on antibiotics S/P Hernia surgery 9/13 with a drain to left abd area

## 2022-09-30 NOTE — ED PROVIDER NOTE - PATIENT PORTAL LINK FT
You can access the FollowMyHealth Patient Portal offered by [facility] by registering at the following website: [email or web address]. By joining [employer]’s FollowMyHealth portal, you will also be able to view your health information using other applications (apps) compatible with our system.

## 2022-09-30 NOTE — CONSULT NOTE ADULT - SUBJECTIVE AND OBJECTIVE BOX
HPI:  40yo female with PMH of HTN, DM, cholecystectomy, ventral hernia repair on 22 with Dr. Stanley who presents c/o vomiting. She states she first came to the ER for complaints of anxiety overnight, but also had an episode of NBNB emesis this AM. Admits to abdominal pain at site of penrose drain. States she felt cold last night and felt warm but did not take her temperature.  Patient recently saw Dr. Stanley in office 2 days ago and was prescribed cipro/flagyl- states the antibiotics are "too hard" on her and "cause her to have anxiety". Unclear if patient has been taking the antibiotics as prescribed. Denies diarrhea, CP, SOB.    PAST MEDICAL & SURGICAL HISTORY:  HTN (hypertension)  DM (diabetes mellitus)  H/O hernia repair  History of cholecystectomy  Status post     Review of Systems:  contained within HPI    MEDICATIONS  (STANDING):    MEDICATIONS  (PRN):      Allergies    No Known Allergies    Intolerances    SOCIAL HISTORY          Smoking: Yes [ ]  No [X]   ______pk yrs          ETOH  Yes [ ]  No [X]  Social [ ]          DRUGS:  Yes [ ]  No [X]  if so what______________    FAMILY HISTORY:  Unknown    Vital Signs Last 24 Hrs  T(C): 36.8 (30 Sep 2022 17:02), Max: 36.8 (30 Sep 2022 17:02)  T(F): 98.3 (30 Sep 2022 17:02), Max: 98.3 (30 Sep 2022 17:02)  HR: 82 (30 Sep 2022 17:02) (82 - 97)  BP: 122/80 (30 Sep 2022 17:02) (122/80 - 170/73)  BP(mean): --  RR: 17 (30 Sep 2022 17:02) (17 - 20)  SpO2: 96% (30 Sep 2022 17:02) (96% - 100%)    Parameters below as of 30 Sep 2022 17:02  Patient On (Oxygen Delivery Method): room air        Physical Exam:  General:  Appears stated age, well-groomed, well-nourished, no distress  Eyes: VALERY  HENT: WNL, no JVD  Chest: clear breath sounds  Cardiovascular: Regular rate & rhythm  Abdomen: soft, NT/ND. Penrose drain with minimal amount of serous/purulent output- no foul odor  Extremities: non edematous extremities  Skin: No rashes or lesions  Musculoskeletal: normal strength  Neuro/Psych: AAO x3      LABS:                        10.8   13.15 )-----------( 633      ( 30 Sep 2022 12:35 )             33.4         135  |  99  |  8   ----------------------------<  132<H>  3.9   |  29  |  0.63    Ca    9.3      30 Sep 2022 12:35    TPro  7.7  /  Alb  2.3<L>  /  TBili  0.3  /  DBili  x   /  AST  29  /  ALT  73  /  AlkPhos  141<H>      PT/INR - ( 30 Sep 2022 12:35 )   PT: 16.3 sec;   INR: 1.37 ratio         PTT - ( 30 Sep 2022 12:35 )  PTT:31.8 sec  Urinalysis Basic - ( 30 Sep 2022 14:15 )    Color: Yellow / Appearance: Clear / S.015 / pH: x  Gluc: x / Ketone: Negative  / Bili: Negative / Urobili: Negative mg/dL   Blood: x / Protein: 30 mg/dL / Nitrite: Negative   Leuk Esterase: Moderate / RBC: 0-2 /HPF / WBC 6-10   Sq Epi: x / Non Sq Epi: Few / Bacteria: Moderate    RADIOLOGY & ADDITIONAL STUDIES:  < from: CT Abdomen and Pelvis w/ IV Cont (22 @ 14:59) >  ACC: [id number] EXAM:  CT ABDOMEN AND PELVIS IC                          PROCEDURE DATE:  2022          INTERPRETATION:  CLINICAL INFORMATION: left lower abd pain s/p hernia   repair two weeks ago FCT    COMPARISON: 9.13.22.   11.23.19    CONTRAST/COMPLICATIONS:  IV Contrast: Omnipaque 350  95 cc administered   5 cc discarded  Oral Contrast: NONE  Complications: None reported at time of study completion    PROCEDURE:  CT of the Abdomen and Pelvis was performed.  Sagittal and coronal reformats were performed.    FINDINGS:    LOWER CHEST: Within normal limits.    LIVER: Within normal limits.  BILE DUCTS: Normal caliber.  GALLBLADDER: Status post cholecystectomy.  SPLEEN: Within normal limits.  PANCREAS: Within normal limits.  ADRENALS: Within normal limits.  KIDNEYS/URETERS: Within normal limits.    BLADDER: Within normal limits.  REPRODUCTIVE ORGANS: A necrotic left-sided uterine mass measures 9.5 x   8.9 cm previously 5 x 4.6 cm on 2019.    BOWEL: No bowel obstruction. The appendix is normal.  PERITONEUM: No ascites.  VESSELS:  Within normal limits.  RETROPERITONEUM/LYMPH NODES: No lymphadenopathy.  ABDOMINAL WALL: Status post lower abdominal ventral hernia repair with a   surgical drain in place. There is inflammation in the ventral abdominal   wall with multiple foci of gas. There is small air-filled cavity   surrounding the surgical drain tip.  BONES: Within normal limits.    IMPRESSION: Status post lower abdominal ventral hernia repair with a   surgical drain in place. Inflammatory changes in the ventral abdominal   wall with multiple foci of gas. A small air-filled cavity surrounding the   surgical drain tip.    Large necrotic left-sided uterine mass has significantly increased in   size concerning for malignant degeneration; GYN consultation recommended.    A/P  40yo female with PMH of HTN, DM, cholecystectomy, ventral hernia repair on 22 with Dr. Stanley who presents c/o vomiting.  With post surgical changes    - d/c on Augmentin, non compliant with cipro/flagyl prescribed by Dr. Stanley  - encouraged to take abx with food and a probiotic to help with nausea  - OP f/u with Dr. Luna on Monday. Must call to make an appointment  - d/w Dr. Stanley

## 2022-10-01 LAB
CULTURE RESULTS: SIGNIFICANT CHANGE UP
SPECIMEN SOURCE: SIGNIFICANT CHANGE UP

## 2022-10-04 ENCOUNTER — APPOINTMENT (OUTPATIENT)
Dept: SURGERY | Facility: CLINIC | Age: 42
End: 2022-10-04

## 2022-10-04 VITALS
BODY MASS INDEX: 40.98 KG/M2 | TEMPERATURE: 98.1 F | HEIGHT: 65 IN | SYSTOLIC BLOOD PRESSURE: 130 MMHG | DIASTOLIC BLOOD PRESSURE: 81 MMHG | OXYGEN SATURATION: 95 % | HEART RATE: 97 BPM | WEIGHT: 246 LBS

## 2022-10-04 PROCEDURE — 99024 POSTOP FOLLOW-UP VISIT: CPT

## 2022-10-04 NOTE — HISTORY OF PRESENT ILLNESS
[de-identified] : Patient is a 41 year old woman who underwent exploratory laparotomy, lysis of adhesions, partial omentectomy, repair of recurrent incisional hernia on 9/13/2022 by Dr. Stanley, who presents for postoperative follow up. Notes that the site of penrose is causing some irritation, but that the she requires dressing changes twice a day with serous drainage, with sponges soaked after changes. Denies fever/chills, otherwise tolerating diet well.\par \par Examination reveals well-healing laparotomy incision, no signs of hernia recurrence, non-TTP, penrose in place in LLQ with serous drainage, no erythema\par \par Given amount of drainage, advised patient to keep penrose in place for now, and patient to follow in 2 weeks. Patient was agreeable with this plan and all her questions were answered.

## 2022-10-05 LAB
CULTURE RESULTS: SIGNIFICANT CHANGE UP
CULTURE RESULTS: SIGNIFICANT CHANGE UP
SPECIMEN SOURCE: SIGNIFICANT CHANGE UP
SPECIMEN SOURCE: SIGNIFICANT CHANGE UP

## 2022-10-12 ENCOUNTER — APPOINTMENT (OUTPATIENT)
Dept: BARIATRICS | Facility: CLINIC | Age: 42
End: 2022-10-12

## 2022-10-12 VITALS
HEART RATE: 101 BPM | WEIGHT: 244.5 LBS | SYSTOLIC BLOOD PRESSURE: 137 MMHG | HEIGHT: 65 IN | DIASTOLIC BLOOD PRESSURE: 85 MMHG | OXYGEN SATURATION: 97 % | TEMPERATURE: 98.1 F | BODY MASS INDEX: 40.73 KG/M2

## 2022-10-12 PROCEDURE — 99024 POSTOP FOLLOW-UP VISIT: CPT

## 2022-10-14 NOTE — HISTORY OF PRESENT ILLNESS
[de-identified] : Patient is a 40 y/o woman with a h/o recurrent incisional hernia s/p ex lap MALINI and repair of incacerated incisional hernia with SBO on 9/13/22.\par Pain improved and drainage improved\par SHe is no longer on abx

## 2022-10-14 NOTE — PHYSICAL EXAM
[de-identified] : NCAT, NAD [de-identified] : Soft, non-tender, non-distended\par Incision CDI\par Drain with serous output, non-malodorous

## 2022-10-26 ENCOUNTER — APPOINTMENT (OUTPATIENT)
Dept: BARIATRICS | Facility: CLINIC | Age: 42
End: 2022-10-26

## 2022-10-26 VITALS
HEART RATE: 98 BPM | BODY MASS INDEX: 41.39 KG/M2 | OXYGEN SATURATION: 97 % | TEMPERATURE: 98.3 F | DIASTOLIC BLOOD PRESSURE: 83 MMHG | SYSTOLIC BLOOD PRESSURE: 141 MMHG | WEIGHT: 248.44 LBS | HEIGHT: 65 IN

## 2022-10-26 PROCEDURE — 99024 POSTOP FOLLOW-UP VISIT: CPT

## 2022-10-26 RX ORDER — CIPROFLOXACIN HYDROCHLORIDE 500 MG/1
500 TABLET, FILM COATED ORAL
Qty: 28 | Refills: 0 | Status: DISCONTINUED | COMMUNITY
Start: 2022-10-26 | End: 2022-10-26

## 2022-10-26 RX ORDER — AMOXICILLIN AND CLAVULANATE POTASSIUM 500; 125 MG/1; MG/1
500-125 TABLET, FILM COATED ORAL 3 TIMES DAILY
Qty: 42 | Refills: 0 | Status: ACTIVE | COMMUNITY
Start: 2022-10-26 | End: 1900-01-01

## 2022-10-26 RX ORDER — METRONIDAZOLE 500 MG/1
500 TABLET ORAL 3 TIMES DAILY
Qty: 42 | Refills: 0 | Status: DISCONTINUED | COMMUNITY
Start: 2022-10-26 | End: 2022-10-26

## 2022-10-26 RX ORDER — CEFUROXIME AXETIL 500 MG/1
500 TABLET ORAL
Qty: 28 | Refills: 0 | Status: ACTIVE | COMMUNITY
Start: 2022-10-26 | End: 1900-01-01

## 2022-10-26 NOTE — HISTORY OF PRESENT ILLNESS
[de-identified] : Patient is a 40 y/o woman with a h/o recurrent incisional hernia s/p ex lap MALINI and repair of incacerated incisional hernia with SBO on 9/13/22.\par Draiange recurred over the last 2 days went to urgent care and received abx\par drainage is clear\par Patient needs to travel tonight to [facility] for a wedding

## 2022-10-26 NOTE — PHYSICAL EXAM
[de-identified] : Midline wound clean, intact\par Left panus/abdominal wound from prior penrose with minimal serous drainage, overlying skin mildly indurated and erythematous

## 2022-10-26 NOTE — PLAN
[FreeTextEntry1] : augmentin for 14 days\par Patient travelling to [facility] today - F/U on return

## 2022-12-27 NOTE — ED ADULT NURSE NOTE - TEMPLATE LIST FOR HEAD TO TOE ASSESSMENT
FOLLOW-UP APPOINTMENTS    [facility] OFFICE - Appointment on *** at *** with ***, Tania [address]. [facility],  [facility], [address] [place] [address] [address], [address] [facility], [address] [place] [address]. Office #: [phone]. If you are unable to make this appointment, please call to reschedule. Directions to [place] [address] towards [place]. [address] [facility] exit. Right off exit. Cross over [employer]. Right on [address]. Left into hospital. Follow the signs to the emergency room ( turn left toward the Emergency room). Go right at the first stop sign. Just past the Emergency room at the second stop sign turn right and go up the ramp and park on the top level if possible. Go in the glass doors of the [facility] we on the top level of the garage [address]. As soon as you get in the door turn left and our office is the one with the glass doors. Fluid/Electrolyte/Metabolic

## 2023-05-23 NOTE — ED ADULT NURSE NOTE - NS ED NOTE ABUSE RESPONSE YN
POST BIOPSY DISCHARGE INSTRUCTION SHEET    DIET:  As tolerated    ACTIVITY:  Rest at home on sofa, bed or recliner today. Bathroom privileges only today. Limit any exertion (pushing or pulling) today. No lifting for 3 days. No driving today. Check biopsy site frequently today. Resume any blood thinners in 24 hours. Keep band aid clean & dry - replace as needed, may remove in 48 hours. RETURN TO NEAREST EMERGENCY ROOM IF YOU HAVE ANY OF THE FOLLOWING:  Sign of bleeding, swelling, drainage from biopsy site or severe pain (slight discomfort to be expected) around biopsy site. Repeated nausea/vomiting/abdominal pain. Elevated temperature above 101 degrees. Shortness of breath. Chest pain. Keep scheduled appointment with your physician. If sedation given, follow post sedation instruction sheet. _SEDATION/ANALGESIA INFORMATION HOME GOING ADVICE    Review the following information with the patient prior to the procedure. Sedation/agalgesia is used during short medical procedures under controlled supervision. The medication will produce a strong relaxation. You will be able to hear, speak and follow instructions, but you memory and alertness will be decreased. You will be able to swallow and breathe on your own. During sedation/analgesia you blood pressure, hear and breathing will be watched closely. After the procedure, you may not remember what was said or done. Procedure: Kidney biopsy     Date: 5/23/23  You may have the following effects from the medication. Drowsiness, dizziness, sleepiness or confusion. Difficulty remembering or delayed reaction times. Loss of fine muscle control or difficulty with your balance especially while walking. Difficulty focusing or blurred vision. You may not be aware of slight changes in your behavior and/or your reaction time because of the medication used during the procedure. Therefore you should follow these instructions.   Have someone responsible Yes

## 2023-08-09 NOTE — ED PROVIDER NOTE - CPE EDP CARDIAC NORM
Attempted to call patient multiple times for phone visit at the number provided when she scheduled the appointment, [phone]. She did not answer. Patient can call back to reschedule at her convenience    Subjective   Patient ID: Julia Vaca is a 43 y.o. female who presents for No chief complaint on file..    HPI   Abscess: She has two of these spots now. She cannot see the specialist yet. When she meets her deductible she can see the surgeon. She is having pain with sitting and laying down. Denies fevers or chills, came back Monday night, small and getting larger. No drainage yet. She has gauze on it and it is still. Red and swollen and very tender. She has been using antibacteria soap when washing.     All other systems reviewed and negative for complaint unless stated above.    Review of Systems    Objective   There were no vitals taken for this visit.    Physical Exam  Phone visit     Assessment/Plan        #Abscess   Rx bactrim   Continue to wash with antibacterial   Working on getting in with Dr. Smith again when she is able to     Call with any worsening symptoms   
normal...

## 2023-12-06 NOTE — ED PROVIDER NOTE - GASTROINTESTINAL [-], MLM
"[facility]   Therapy Progress Note      Therapy was provided on this date in coordination with the [facility] approved Clinical Supervisor under the direct supervision of Dr. Cadena who was on site during this visit.    Therapist reviewed informed consent, limits of confidentiality and [facility] policies; patient expressed understanding and agreed to voluntarily proceed with evaluation and treatment.    Name: Dori Martinez  MRN: [id number]  : 2005  Age: 18 y.o.  Date of assessment: 2023  PCP: DEIDRE Groves  Persons in attendance: Patient and SIL Juarez-Intern  Total session time: 30 minutes      Topics addressed in psychotherapy include:     Focused on building rapport and identifying goals for therapy. Client reported Dad and wife recently had a baby (Born in February). Client does not express strong feelings about the situation but reported \"My dad has a very hands-off approach... like if the baby is crying he will just look at his wife and tell her to make it stop\". Client reported primary concern at this time is that she is \"having a really difficult time with school... I tend to overestimate how much I can actually do\". Client has been struggling with to find the motivation to complete school work and has failed several classes. Discussed reducing course-load to a more manageable level and identifying methods to self-motivate (eg. Rewarding self for passing a quiz or completing homework for the week). Also Discussed looking at end goal in the frame of smaller goals (eg. Completing a semester).   Client could not identify and new or pressing issues that she would like to address at this time. Discussed moving sessions to once a month and client was receptive.     Objective Observations:   Participation:Active verbal participation, Attentive, Engaged, and Open to feedback   Grooming:Casual and Neat   Cognition:Alert and Fully Oriented   Eye " Contact:Good   Mood: Nonchalant   Affect:Congruent with content   Thought Process:Logical and Goal-directed   Speech:Rate within normal limits and Volume within normal limits    Current Risk:   Suicide:  Not indicated   Homicide:  Not indicated   Self-Harm:  Not indicated    Relapse:  Not indicated    Safety Plan Reviewed: not applicable        Diagnosis:  1. Generalized anxiety disorder            Jennifer Mijares LMSW, CSW-Intern     no diarrhea/no nausea/no vomiting/no melena

## 2024-03-06 NOTE — ED ADULT NURSE NOTE - BRAND OF COVID-19 VACCINATION
Detail Level: Detailed Note Text (......Xxx Chief Complaint.): This diagnosis correlates with the Other (Free Text): Hx [facility] Left lateral shoulder-ED&C 10/2015 NER whss.\\n[facility] R mid back-clear after bx biopsy 10/2015\\nBCC L ant shoulder- exc 10/2015 NER whss. \\nBCC L sup central forehead- MOHS 1/2017 NER whss\\nBcc nasal root treated via Mohs 11/2020\\nBCC NASAL DORSUM 8/4/22 treated via mohs [facility] NER\\nBCC- right lateral zygoma - MOHS [facility] NER   10/2023\\nBCC - right anterior shoulder - margins clear 2/2024 Render Risk Assessment In Note?: no Detail Level: Simple Other (Free Text): Margins clear Other

## 2024-08-09 NOTE — ED ADULT NURSE NOTE - RADIATION
Patient's first and last name, , procedure, and correct site confirmed prior to the start of procedure. abdominal region/chest

## 2024-10-04 NOTE — ED ADULT NURSE NOTE - NSSUHOSCREENINGYN_ED_ALL_ED
Staff spoke with patient and got her scheduled for in office ONB.   Yes - the patient is able to be screened

## 2025-07-23 NOTE — PATIENT PROFILE ADULT - FUNCTIONAL ASSESSMENT - BASIC MOBILITY ASSESSMENT TYPE
Outreach attempts to coordinate scheduling on the patient's Service to Behavioral Health order in workqueue [id number] requested on 7/23/2025 have been conducted.    This order is still valid for one year from date it was placed. Patient may call Central Scheduling at [phone] and we would be happy to assist them.    
Admission

## (undated) DEVICE — SUT VICRYL 3-0 18" SH UNDYED (POP-OFF)

## (undated) DEVICE — POSITIONER STRAP ARMBOARD VELCRO TS-30

## (undated) DEVICE — FRA-ESU BOVIE FORCE TRIAD T6D04777DX: Type: DURABLE MEDICAL EQUIPMENT

## (undated) DEVICE — LIGASURE IMPACT

## (undated) DEVICE — SUT CHROMIC 3-0 27" SH

## (undated) DEVICE — SUT SILK 3-0 18" SH (POP-OFF)

## (undated) DEVICE — SOL IRR POUR H2O 1500ML

## (undated) DEVICE — SUT PROLENE 2 60" TP-1

## (undated) DEVICE — FOLEY CATH 2-WAY 14FR 5CC SILICONE

## (undated) DEVICE — CANISTER DISPOSABLE THIN WALL 3000CC

## (undated) DEVICE — STAPLER SKIN MULTI DIRECTION W35

## (undated) DEVICE — DRAPE MAYO STAND 23"

## (undated) DEVICE — POOLE SUCTION TIP

## (undated) DEVICE — DRAPE TOWEL BLUE 17" X 24"

## (undated) DEVICE — SUT VICRYL 2-0 18" TIES UNDYED

## (undated) DEVICE — WARMING BLANKET FULL UNDERBODY

## (undated) DEVICE — LAP PAD W RING 18 X 18"

## (undated) DEVICE — SOL IRR POUR NS 0.9% 1500ML

## (undated) DEVICE — SUT VICRYL 2-0 27" SH UNDYED

## (undated) DEVICE — SUT VICRYL 0 18" TIES UNDYED

## (undated) DEVICE — ELCTR BOVIE TIP BLADE INSULATED 6.5" EDGE

## (undated) DEVICE — VENODYNE/SCD SLEEVE CALF MEDIUM

## (undated) DEVICE — DRSG TEGADERM 4X4.75"

## (undated) DEVICE — DRAPE 3/4 SHEET 52X76"

## (undated) DEVICE — PACK MAJOR ABDOMINAL WITH LAP

## (undated) DEVICE — FOLEY TRAY 16FR 5CC LF UMETER CLOSED

## (undated) DEVICE — SUT PROLENE 1 30" CT-1

## (undated) DEVICE — ELCTR GROUNDING PAD ADULT COVIDIEN

## (undated) DEVICE — DRAPE FLUID WARMER 44 X 44"

## (undated) DEVICE — DRSG TELFA 3 X 8

## (undated) DEVICE — ELCTR BOVIE TIP BLADE INSULATED 2.75" EDGE

## (undated) DEVICE — LABELS BLANK W PEN

## (undated) DEVICE — PROTECTOR HEEL / ELBOW FLUFFY

## (undated) DEVICE — DRAPE LAPAROTOMY W VELCRO CORD TABS

## (undated) DEVICE — SUT PDS II 1 48" TP-1